# Patient Record
Sex: MALE | Race: WHITE | NOT HISPANIC OR LATINO | Employment: OTHER | URBAN - METROPOLITAN AREA
[De-identification: names, ages, dates, MRNs, and addresses within clinical notes are randomized per-mention and may not be internally consistent; named-entity substitution may affect disease eponyms.]

---

## 2017-01-18 ENCOUNTER — LAB REQUISITION (OUTPATIENT)
Dept: LAB | Facility: HOSPITAL | Age: 72
End: 2017-01-18
Payer: COMMERCIAL

## 2017-01-18 DIAGNOSIS — K63.5 POLYP OF COLON: ICD-10-CM

## 2017-01-18 PROCEDURE — 88305 TISSUE EXAM BY PATHOLOGIST: CPT | Performed by: COLON & RECTAL SURGERY

## 2018-04-02 NOTE — H&P
Grade 4 internal external hemorrhoids with the bleeding for internal external hemorrhoidectomy as explained  Otherwise history and physical is essentially normal as noted    Colonoscopy January 2018 was normal

## 2018-04-03 ENCOUNTER — TRANSCRIBE ORDERS (OUTPATIENT)
Dept: LAB | Facility: CLINIC | Age: 73
End: 2018-04-03

## 2018-04-03 ENCOUNTER — APPOINTMENT (OUTPATIENT)
Dept: LAB | Facility: CLINIC | Age: 73
End: 2018-04-03
Payer: COMMERCIAL

## 2018-04-03 DIAGNOSIS — K62.5 HEMORRHAGE OF RECTUM AND ANUS: ICD-10-CM

## 2018-04-03 DIAGNOSIS — K62.5 HEMORRHAGE OF RECTUM AND ANUS: Primary | ICD-10-CM

## 2018-04-03 LAB
ALBUMIN SERPL BCP-MCNC: 3.9 G/DL (ref 3.5–5)
ALP SERPL-CCNC: 74 U/L (ref 46–116)
ALT SERPL W P-5'-P-CCNC: 31 U/L (ref 12–78)
ANION GAP SERPL CALCULATED.3IONS-SCNC: 6 MMOL/L (ref 4–13)
AST SERPL W P-5'-P-CCNC: 15 U/L (ref 5–45)
BASOPHILS # BLD AUTO: 0.04 THOUSANDS/ΜL (ref 0–0.1)
BASOPHILS NFR BLD AUTO: 1 % (ref 0–1)
BILIRUB SERPL-MCNC: 0.3 MG/DL (ref 0.2–1)
BILIRUB UR QL STRIP: NEGATIVE
BUN SERPL-MCNC: 16 MG/DL (ref 5–25)
CALCIUM SERPL-MCNC: 8.8 MG/DL (ref 8.3–10.1)
CHLORIDE SERPL-SCNC: 105 MMOL/L (ref 100–108)
CLARITY UR: CLEAR
CO2 SERPL-SCNC: 31 MMOL/L (ref 21–32)
COLOR UR: YELLOW
CREAT SERPL-MCNC: 1.08 MG/DL (ref 0.6–1.3)
EOSINOPHIL # BLD AUTO: 0.21 THOUSAND/ΜL (ref 0–0.61)
EOSINOPHIL NFR BLD AUTO: 3 % (ref 0–6)
ERYTHROCYTE [DISTWIDTH] IN BLOOD BY AUTOMATED COUNT: 13 % (ref 11.6–15.1)
GFR SERPL CREATININE-BSD FRML MDRD: 67 ML/MIN/1.73SQ M
GLUCOSE P FAST SERPL-MCNC: 110 MG/DL (ref 65–99)
GLUCOSE UR STRIP-MCNC: NEGATIVE MG/DL
HCT VFR BLD AUTO: 44.3 % (ref 36.5–49.3)
HGB BLD-MCNC: 14.8 G/DL (ref 12–17)
HGB UR QL STRIP.AUTO: NEGATIVE
KETONES UR STRIP-MCNC: NEGATIVE MG/DL
LEUKOCYTE ESTERASE UR QL STRIP: NEGATIVE
LYMPHOCYTES # BLD AUTO: 2.08 THOUSANDS/ΜL (ref 0.6–4.47)
LYMPHOCYTES NFR BLD AUTO: 32 % (ref 14–44)
MCH RBC QN AUTO: 30.9 PG (ref 26.8–34.3)
MCHC RBC AUTO-ENTMCNC: 33.4 G/DL (ref 31.4–37.4)
MCV RBC AUTO: 93 FL (ref 82–98)
MONOCYTES # BLD AUTO: 0.61 THOUSAND/ΜL (ref 0.17–1.22)
MONOCYTES NFR BLD AUTO: 9 % (ref 4–12)
NEUTROPHILS # BLD AUTO: 3.58 THOUSANDS/ΜL (ref 1.85–7.62)
NEUTS SEG NFR BLD AUTO: 55 % (ref 43–75)
NITRITE UR QL STRIP: NEGATIVE
PH UR STRIP.AUTO: 6 [PH] (ref 4.5–8)
PLATELET # BLD AUTO: 258 THOUSANDS/UL (ref 149–390)
PMV BLD AUTO: 10.1 FL (ref 8.9–12.7)
POTASSIUM SERPL-SCNC: 3.9 MMOL/L (ref 3.5–5.3)
PROT SERPL-MCNC: 7.2 G/DL (ref 6.4–8.2)
PROT UR STRIP-MCNC: NEGATIVE MG/DL
RBC # BLD AUTO: 4.79 MILLION/UL (ref 3.88–5.62)
SODIUM SERPL-SCNC: 142 MMOL/L (ref 136–145)
SP GR UR STRIP.AUTO: 1.02 (ref 1–1.03)
UROBILINOGEN UR QL STRIP.AUTO: 0.2 E.U./DL
WBC # BLD AUTO: 6.52 THOUSAND/UL (ref 4.31–10.16)

## 2018-04-03 PROCEDURE — 80053 COMPREHEN METABOLIC PANEL: CPT

## 2018-04-03 PROCEDURE — 36415 COLL VENOUS BLD VENIPUNCTURE: CPT

## 2018-04-03 PROCEDURE — 81003 URINALYSIS AUTO W/O SCOPE: CPT | Performed by: COLON & RECTAL SURGERY

## 2018-04-03 PROCEDURE — 85025 COMPLETE CBC W/AUTO DIFF WBC: CPT

## 2018-04-13 RX ORDER — TAMSULOSIN HYDROCHLORIDE 0.4 MG/1
0.4 CAPSULE ORAL
COMMUNITY
End: 2018-06-13 | Stop reason: SDUPTHER

## 2018-04-13 RX ORDER — MULTIVITAMIN
1 TABLET ORAL DAILY
COMMUNITY

## 2018-04-13 NOTE — PRE-PROCEDURE INSTRUCTIONS
Pre-Surgery Instructions:   Medication Instructions    B Complex Vitamins (VITAMIN B-COMPLEX PO) Instructed patient per Anesthesia Guidelines   Cholecalciferol (VITAMIN D-3 PO) Instructed patient per Anesthesia Guidelines   Multiple Vitamin (MULTIVITAMIN) tablet Instructed patient per Anesthesia Guidelines   tamsulosin (FLOMAX) 0 4 mg Instructed patient per Anesthesia Guidelines  Spoke to pt  Medication list reviewed & instructed  As of 4 13 18 pt to stop vitamins  Instructed on tylenol only  Am DOS no meds  Showering instructions & fleet enema instructions given by office, reviewed at time of call  All instructions verbally understood  No further questions

## 2018-04-16 ENCOUNTER — ANESTHESIA EVENT (OUTPATIENT)
Dept: PERIOP | Facility: HOSPITAL | Age: 73
End: 2018-04-16
Payer: COMMERCIAL

## 2018-04-17 ENCOUNTER — ANESTHESIA (OUTPATIENT)
Dept: PERIOP | Facility: HOSPITAL | Age: 73
End: 2018-04-17
Payer: COMMERCIAL

## 2018-04-17 ENCOUNTER — HOSPITAL ENCOUNTER (OUTPATIENT)
Facility: HOSPITAL | Age: 73
Setting detail: OUTPATIENT SURGERY
Discharge: HOME | End: 2018-04-17
Attending: COLON & RECTAL SURGERY | Admitting: COLON & RECTAL SURGERY
Payer: COMMERCIAL

## 2018-04-17 VITALS
DIASTOLIC BLOOD PRESSURE: 76 MMHG | OXYGEN SATURATION: 100 % | HEART RATE: 69 BPM | BODY MASS INDEX: 26.37 KG/M2 | TEMPERATURE: 98.1 F | RESPIRATION RATE: 18 BRPM | HEIGHT: 67 IN | WEIGHT: 168 LBS | SYSTOLIC BLOOD PRESSURE: 128 MMHG

## 2018-04-17 DIAGNOSIS — K64.4 RESIDUAL HEMORRHOIDAL SKIN TAGS: ICD-10-CM

## 2018-04-17 DIAGNOSIS — K64.8 OTHER HEMORRHOIDS: ICD-10-CM

## 2018-04-17 PROCEDURE — 88304 TISSUE EXAM BY PATHOLOGIST: CPT | Performed by: PATHOLOGY

## 2018-04-17 RX ORDER — ONDANSETRON 2 MG/ML
4 INJECTION INTRAMUSCULAR; INTRAVENOUS ONCE AS NEEDED
Status: DISCONTINUED | OUTPATIENT
Start: 2018-04-17 | End: 2018-04-17 | Stop reason: HOSPADM

## 2018-04-17 RX ORDER — LIDOCAINE HYDROCHLORIDE 10 MG/ML
INJECTION, SOLUTION INFILTRATION; PERINEURAL AS NEEDED
Status: DISCONTINUED | OUTPATIENT
Start: 2018-04-17 | End: 2018-04-17 | Stop reason: SURG

## 2018-04-17 RX ORDER — SODIUM CHLORIDE, SODIUM LACTATE, POTASSIUM CHLORIDE, CALCIUM CHLORIDE 600; 310; 30; 20 MG/100ML; MG/100ML; MG/100ML; MG/100ML
20 INJECTION, SOLUTION INTRAVENOUS CONTINUOUS
Status: DISCONTINUED | OUTPATIENT
Start: 2018-04-17 | End: 2018-04-17

## 2018-04-17 RX ORDER — ALBUTEROL SULFATE 2.5 MG/3ML
2.5 SOLUTION RESPIRATORY (INHALATION) ONCE AS NEEDED
Status: DISCONTINUED | OUTPATIENT
Start: 2018-04-17 | End: 2018-04-17 | Stop reason: HOSPADM

## 2018-04-17 RX ORDER — PROMETHAZINE HYDROCHLORIDE 25 MG/ML
12.5 INJECTION, SOLUTION INTRAMUSCULAR; INTRAVENOUS ONCE AS NEEDED
Status: DISCONTINUED | OUTPATIENT
Start: 2018-04-17 | End: 2018-04-17 | Stop reason: HOSPADM

## 2018-04-17 RX ORDER — BUPIVACAINE HYDROCHLORIDE AND EPINEPHRINE 2.5; 5 MG/ML; UG/ML
INJECTION, SOLUTION EPIDURAL; INFILTRATION; INTRACAUDAL; PERINEURAL AS NEEDED
Status: DISCONTINUED | OUTPATIENT
Start: 2018-04-17 | End: 2018-04-17 | Stop reason: HOSPADM

## 2018-04-17 RX ORDER — DOCUSATE SODIUM 100 MG/1
100 CAPSULE, LIQUID FILLED ORAL 2 TIMES DAILY
Status: DISCONTINUED | OUTPATIENT
Start: 2018-04-17 | End: 2018-04-17 | Stop reason: HOSPADM

## 2018-04-17 RX ORDER — SODIUM CHLORIDE, SODIUM LACTATE, POTASSIUM CHLORIDE, CALCIUM CHLORIDE 600; 310; 30; 20 MG/100ML; MG/100ML; MG/100ML; MG/100ML
50 INJECTION, SOLUTION INTRAVENOUS CONTINUOUS
Status: DISCONTINUED | OUTPATIENT
Start: 2018-04-17 | End: 2018-04-17 | Stop reason: HOSPADM

## 2018-04-17 RX ORDER — FENTANYL CITRATE 50 UG/ML
INJECTION, SOLUTION INTRAMUSCULAR; INTRAVENOUS AS NEEDED
Status: DISCONTINUED | OUTPATIENT
Start: 2018-04-17 | End: 2018-04-17 | Stop reason: SURG

## 2018-04-17 RX ORDER — PROPOFOL 10 MG/ML
INJECTION, EMULSION INTRAVENOUS CONTINUOUS PRN
Status: DISCONTINUED | OUTPATIENT
Start: 2018-04-17 | End: 2018-04-17 | Stop reason: SURG

## 2018-04-17 RX ORDER — OXYCODONE HYDROCHLORIDE AND ACETAMINOPHEN 5; 325 MG/1; MG/1
1 TABLET ORAL EVERY 4 HOURS PRN
Status: DISCONTINUED | OUTPATIENT
Start: 2018-04-17 | End: 2018-04-17 | Stop reason: HOSPADM

## 2018-04-17 RX ORDER — FENTANYL CITRATE/PF 50 MCG/ML
50 SYRINGE (ML) INJECTION
Status: DISCONTINUED | OUTPATIENT
Start: 2018-04-17 | End: 2018-04-17 | Stop reason: HOSPADM

## 2018-04-17 RX ORDER — EPHEDRINE SULFATE 50 MG/ML
INJECTION, SOLUTION INTRAVENOUS AS NEEDED
Status: DISCONTINUED | OUTPATIENT
Start: 2018-04-17 | End: 2018-04-17 | Stop reason: SURG

## 2018-04-17 RX ORDER — SODIUM CHLORIDE, SODIUM LACTATE, POTASSIUM CHLORIDE, CALCIUM CHLORIDE 600; 310; 30; 20 MG/100ML; MG/100ML; MG/100ML; MG/100ML
75 INJECTION, SOLUTION INTRAVENOUS CONTINUOUS
Status: DISCONTINUED | OUTPATIENT
Start: 2018-04-17 | End: 2018-04-17 | Stop reason: HOSPADM

## 2018-04-17 RX ORDER — OXYCODONE HYDROCHLORIDE AND ACETAMINOPHEN 5; 325 MG/1; MG/1
1 TABLET ORAL EVERY 4 HOURS PRN
Qty: 20 TABLET | Refills: 0 | Status: SHIPPED | OUTPATIENT
Start: 2018-04-17 | End: 2018-04-27

## 2018-04-17 RX ORDER — METOCLOPRAMIDE HYDROCHLORIDE 5 MG/ML
10 INJECTION INTRAMUSCULAR; INTRAVENOUS ONCE AS NEEDED
Status: DISCONTINUED | OUTPATIENT
Start: 2018-04-17 | End: 2018-04-17 | Stop reason: HOSPADM

## 2018-04-17 RX ADMIN — FENTANYL CITRATE 25 MCG: 50 INJECTION, SOLUTION INTRAMUSCULAR; INTRAVENOUS at 10:51

## 2018-04-17 RX ADMIN — EPHEDRINE SULFATE 5 MG: 50 INJECTION, SOLUTION INTRAMUSCULAR; INTRAVENOUS; SUBCUTANEOUS at 11:14

## 2018-04-17 RX ADMIN — LIDOCAINE HYDROCHLORIDE 100 MG: 10 INJECTION, SOLUTION INFILTRATION; PERINEURAL at 10:48

## 2018-04-17 RX ADMIN — EPHEDRINE SULFATE 5 MG: 50 INJECTION, SOLUTION INTRAMUSCULAR; INTRAVENOUS; SUBCUTANEOUS at 11:20

## 2018-04-17 RX ADMIN — SODIUM CHLORIDE, SODIUM LACTATE, POTASSIUM CHLORIDE, AND CALCIUM CHLORIDE 20 ML/HR: .6; .31; .03; .02 INJECTION, SOLUTION INTRAVENOUS at 08:57

## 2018-04-17 RX ADMIN — FENTANYL CITRATE 25 MCG: 50 INJECTION, SOLUTION INTRAMUSCULAR; INTRAVENOUS at 10:48

## 2018-04-17 RX ADMIN — OXYCODONE HYDROCHLORIDE AND ACETAMINOPHEN 1 TABLET: 5; 325 TABLET ORAL at 13:12

## 2018-04-17 RX ADMIN — PROPOFOL 60 MCG/KG/MIN: 10 INJECTION, EMULSION INTRAVENOUS at 10:48

## 2018-04-17 RX ADMIN — EPHEDRINE SULFATE 5 MG: 50 INJECTION, SOLUTION INTRAMUSCULAR; INTRAVENOUS; SUBCUTANEOUS at 10:53

## 2018-04-17 RX ADMIN — EPHEDRINE SULFATE 10 MG: 50 INJECTION, SOLUTION INTRAMUSCULAR; INTRAVENOUS; SUBCUTANEOUS at 11:01

## 2018-04-17 NOTE — PROGRESS NOTES
Tolerated light snack PO  Pt's prescription tubed to [facility] tube # 11  Pt's wife Melissa Yanes to  when ready

## 2018-04-17 NOTE — ANESTHESIA POSTPROCEDURE EVALUATION
Post-Op Assessment Note      CV Status:  Stable    Mental Status:  Alert and awake    Hydration Status:  Euvolemic    PONV Controlled:  Controlled    Airway Patency:  Patent    Post Op Vitals Reviewed: Yes          Staff: CRNA           BP   111/69   Temp   98 4   Pulse  72   Resp   18   SpO2   98

## 2018-04-17 NOTE — ANESTHESIA PREPROCEDURE EVALUATION
Review of Systems/Medical History  Patient summary reviewed  Chart reviewed  No history of anesthetic complications     Cardiovascular  EKG reviewed,   Comment: Sinus bradycardia  Otherwise normal ECG  No previous ECGs available  Confirmed by JARED Mayer , Gee Moreno ([id number]) on 12/31/2015 8:58:14 AM    Specimen Collected: 12/30/15 11:34  Last Resulted: 12/31/15 08:58      ,  Pulmonary       GI/Hepatic      Comment: hemorrhoids          Endo/Other     GYN       Hematology   Musculoskeletal       Neurology   Psychology           Physical Exam    Airway    Mallampati score: II  TM Distance: >3 FB  Neck ROM: full     Dental   No notable dental hx     Cardiovascular  Cardiovascular exam normal    Pulmonary  Pulmonary exam normal Breath sounds clear to auscultation,     Other Findings        Anesthesia Plan  ASA Score- 2     Anesthesia Type- IV sedation with anesthesia with ASA Monitors  Additional Monitors:   Airway Plan:         Plan Factors- Patient instructed to abstain from smoking on day of procedure  Patient did not smoke on day of surgery  Induction- intravenous  Postoperative Plan- Plan for postoperative opioid use  Informed Consent- Anesthetic plan and risks discussed with patient  I personally reviewed this patient with the CRNA  Discussed and agreed on the Anesthesia Plan with the CRNA           Lab Results   Component Value Date    WBC 6 52 04/03/2018    HGB 14 8 04/03/2018    HCT 44 3 04/03/2018    MCV 93 04/03/2018     04/03/2018     Lab Results   Component Value Date    GLUCOSE 105 12/30/2015    CALCIUM 8 8 04/03/2018     04/03/2018    K 3 9 04/03/2018    CO2 31 04/03/2018     04/03/2018    BUN 16 04/03/2018    CREATININE 1 08 04/03/2018     No results found for: INR, PROTIME  No results found for: PTT        I, Dr Katelin Roth, the attending physician, have personally seen and evaluated the patient prior to anesthetic care   I have reviewed the pre-anesthetic record, and other medical records if appropriate to the anesthetic care  If a CRNA is involved in the case, I have reviewed the CRNA assessment, if present, and agree  The patient is in a suitable condition to proceed with my formulated anesthetic plan

## 2018-04-17 NOTE — OP NOTE
OPERATIVE REPORT  PATIENT NAME: Leanne Ocampo    :  1945  MRN: [id number]  Pt Location: [facility] OR ROOM 10    SURGERY DATE: 2018    Surgeon(s) and Role:     * Belkis Garcia MD - Primary     * Erlin Boucher MD - Assisting    Preop Diagnosis:  Residual hemorrhoidal skin tags [K64 4]  Other hemorrhoids [K64 8]    Post-Op Diagnosis Codes:     * Residual hemorrhoidal skin tags [K64 4]     * Other hemorrhoids [K64 8]    Procedure(s) (LRB):  EXTENSIVE internal and external HEMORRHOIDECTOMY EXCISION (N/A)    Specimen(s):  ID Type Source Tests Collected by Time Destination   1 : extensive internal and external hemmorrhoid Tissue Hemorrhoids TISSUE EXAM Belkis Garcia MD 2018 1054        Estimated Blood Loss:   50 mL    Drains:       Anesthesia Type:   Choice    Operative Indications:  Residual hemorrhoidal skin tags [K64 4]  Other hemorrhoids [K64 8]      Operative Findings:  Right anterolateral and right posterolateral and left lateral large internal external hemorrhoid class 4 were excised  Left posterolateral external hemorrhoidal skin tag was excised  All wounds were closed using 5 0 Vicryl running suture  Hemostasis was well affected    Complications:   None    Procedure and Technique:  After proper facial and armband identification and time out, patient under mild IV sedation was placed in prone position and 20  Cc  25% Bupivacaine local anethesia was injected in the perianal area  With satisfactory local anesthesia obtained the anal area was inspected  There was circumferential internal and external hemorrhoidal components present that could be localized in right anterolateral, left lateral and right posterolateral perianal aspect  The hemorrhoidal components were all excised and submucosal dissection for extensive circumferential hemorrhoidectomy was performed  All wounds were closed using 5-0 Vicryl running suture   Left posterolateral external hemorrhoidal skin tag was also excised and the wound was closed using 5 0 Vicryl running suture  Hemostasis was further obtained using interrupted sutures  Sponges and counts were all correct  The patient was taken to the recovery room in satisfactory condition       I was present for the entire procedure    Patient Disposition:  PACU     SIGNATURE: Roxie Bar MD  DATE: April 17, 2018  TIME: 11:45 AM

## 2018-04-17 NOTE — DISCHARGE INSTRUCTIONS
Hemorrhoidectomy   WHAT YOU NEED TO KNOW:   A hemorrhoidectomy is surgery to remove a hemorrhoid  DISCHARGE INSTRUCTIONS:   Call 911 for any of the following:   · You have trouble breathing  Seek care immediately if:   · Blood soaks through your bandage or underwear  · Your stitches come apart  · You have severe pain in your rectum or abdomen  · You cannot urinate, or you urinate very little  Contact your healthcare provider if:   · You have a fever or chills  · Your pain does not get better after you take pain medicine  · You do not have a bowel movement within 48 hours after surgery  · You have severe pain when you have a bowel movement  · Your wound is red, swollen, or draining pus  · You have nausea or are vomiting  · Your skin is itchy, swollen, or you have a rash  · You have trouble controlling your bowel movements  · You have questions or concerns about your condition or care  Medicines: You may need any of the following:  · Medicine  may be given to decrease pain and swelling  The medicine may come as a pad, cream, or ointment  · Antibiotics  help prevent a bacterial infection  They may be given as a pill or an ointment  · Stool softeners  help prevent constipation  · Laxatives  help you have a bowel movement and prevent constipation  · Prescription pain medicine  may be given  Ask your healthcare provider how to take this medicine safely  Some prescription pain medicines contain acetaminophen  Do not take other medicines that contain acetaminophen without talking to your healthcare provider  Too much acetaminophen may cause liver damage  Prescription pain medicine may cause constipation  Ask your healthcare provider how to prevent or treat constipation  · NSAIDs , such as ibuprofen, help decrease swelling, pain, and fever  NSAIDs can cause stomach bleeding or kidney problems in certain people   If you take blood thinner medicine, always ask your healthcare provider if NSAIDs are safe for you  Always read the medicine label and follow directions  · Take your medicine as directed  Contact your healthcare provider if you think your medicine is not helping or if you have side effects  Tell him or her if you are allergic to any medicine  Keep a list of the medicines, vitamins, and herbs you take  Include the amounts, and when and why you take them  Bring the list or the pill bottles to follow-up visits  Carry your medicine list with you in case of an emergency  Care for your wound as directed:   · Remove your bandage or packing as directed  Carefully wash around the wound with soap and water  It is okay to let soap and water gently run over your incision  Gently pat the area dry  Apply ointment or cream as directed  Put on new, clean bandages as directed  Change your bandages when they get wet or dirty  · Keep your anal area clean  After a bowel movement, wipe with moist towelettes or wet toilet paper  Dry toilet paper can irritate the area  Wear a sanitary pad to absorb bleeding and keep the area clean and dry  Self-care:   · Apply ice on your anus for 15 to 20 minutes every hour or as directed  Use an ice pack, or put crushed ice in a plastic bag  Cover it with a towel before you put it on your skin  Ice helps prevent tissue damage and decreases swelling and pain  · Take a sitz bath  A sitz bath can help decrease pain and swelling  Do this 3 times a day, and after each bowel movement  Fill a bathtub with 4 to 6 inches of warm water  You may also use a sitz bath pan that fits inside a toilet bowl  Sit in the sitz bath for 15 minutes  · Sit on a pillow or a donut-shaped cushion  This helps relieve pressure and pain on your incision  Ask your healthcare provider where to buy a donut-shaped cushion  If you have pain when you sit, lie on your side  · Do not have anal sex  Anal sex can cause your stitches to come apart   Ask your healthcare provider how long you need to follow these instructions  · Do not lift anything heavier than 5 pounds  This can increase pressure in your rectum or anus and cause your incision to come apart  Prevent constipation:  You should try to have a bowel movement within 48 hours after surgery  Constipation can cause pain and put pressure on your incision  Do the following to prevent constipation:  · Drink plenty of liquids  Liquids can help prevent constipation and straining  Ask how much liquid to drink each day and which liquids are best for you  · Eat a variety of high-fiber foods  This will help make it easier to have a bowel movement  Examples include fruits, vegetables, and whole grains  Ask your healthcare provider how much fiber you need each day  You may need to take a fiber supplement  · Exercise as directed  Exercise, such as walking, may make it easier to have a bowel movement  Ask your healthcare provider what exercises are safe for you to do after surgery  Follow up with your healthcare provider as directed:  Write down your questions so you remember to ask them during your visits  © 2017 [facility] Information is for End User's use only and may not be sold, redistributed or otherwise used for commercial purposes  All illustrations and images included in CareNotes® are the copyrighted property of A D A M , Inc  or Johnathon David  The above information is an  only  It is not intended as medical advice for individual conditions or treatments  Talk to your doctor, nurse or pharmacist before following any medical regimen to see if it is safe and effective for you

## 2018-04-17 NOTE — PERIOPERATIVE NURSING NOTE
Report received from Vasiliy Khanna, [facility]  Assessment wnl  Will continue to monitor

## 2018-06-13 ENCOUNTER — OFFICE VISIT (OUTPATIENT)
Dept: UROLOGY | Facility: CLINIC | Age: 73
End: 2018-06-13
Payer: COMMERCIAL

## 2018-06-13 VITALS
HEART RATE: 65 BPM | SYSTOLIC BLOOD PRESSURE: 121 MMHG | DIASTOLIC BLOOD PRESSURE: 76 MMHG | WEIGHT: 168 LBS | BODY MASS INDEX: 26.37 KG/M2 | HEIGHT: 67 IN

## 2018-06-13 DIAGNOSIS — N40.1 BPH WITH OBSTRUCTION/LOWER URINARY TRACT SYMPTOMS: ICD-10-CM

## 2018-06-13 DIAGNOSIS — N42.9 PROSTATE DISORDER: Primary | ICD-10-CM

## 2018-06-13 DIAGNOSIS — N13.8 BPH WITH OBSTRUCTION/LOWER URINARY TRACT SYMPTOMS: ICD-10-CM

## 2018-06-13 LAB
SL AMB  POCT GLUCOSE, UA: NORMAL
SL AMB LEUKOCYTE ESTERASE,UA: NORMAL
SL AMB POCT BLOOD,UA: NORMAL
SL AMB POCT CLARITY,UA: CLEAR
SL AMB POCT COLOR,UA: YELLOW
SL AMB POCT KETONES,UA: NORMAL
SL AMB POCT NITRITE,UA: NORMAL
SL AMB POCT PH,UA: 5
SL AMB POCT URINE PROTEIN: NORMAL

## 2018-06-13 PROCEDURE — 81002 URINALYSIS NONAUTO W/O SCOPE: CPT | Performed by: UROLOGY

## 2018-06-13 PROCEDURE — 99213 OFFICE O/P EST LOW 20 MIN: CPT | Performed by: UROLOGY

## 2018-06-13 RX ORDER — TAMSULOSIN HYDROCHLORIDE 0.4 MG/1
0.4 CAPSULE ORAL
Qty: 90 CAPSULE | Refills: 3 | Status: SHIPPED | OUTPATIENT
Start: 2018-06-13 | End: 2019-06-19 | Stop reason: SDUPTHER

## 2018-06-13 NOTE — PROGRESS NOTES
UROLOGY PROGRESS NOTE   Patient Identifiers: Nasir Mueller (MRN [de-identified])  Date of Service: 6/13/2018    Subjective:    74year old male with a long history of BPh and elevated PSa  He had 2 prostate biopsied in 2003 both benign  PSA's have been in the 5 range  Current PSA is pending  Last year he had a 4 k score showing 92% negative predictive value for aggressive disease  AUA index score 14  Comfortable on tamsulosin  Patient has  no complaints  Objective:     VITALS:    Vitals:    06/13/18 0953   BP: 121/76   Pulse: 65     AUA SYMPTOM SCORE      Most Recent Value   AUA SYMPTOM SCORE   How often have you had a sensation of not emptying your bladder completely after you finished urinating? 3   How often have you had to urinate again less than two hours after you finished urinating? 2   How often have you found you stopped and started again several times when you urinate? 2   How often have you found it difficult to postpone urination? 1   How often have you had a weak urinary stream?  3   How often have you had to push or strain to begin urination? 1   How many times did you most typically get up to urinate from the time you went to bed at night until the time you got up in the morning?   2   Quality of Life: If you were to spend the rest of your life with your urinary condition just the way it is now, how would you feel about that?  --   AUA SYMPTOM SCORE  14            LABS:  Lab Results   Component Value Date    HGB 14 8 04/03/2018    HCT 44 3 04/03/2018    WBC 6 52 04/03/2018     04/03/2018   ]    Lab Results   Component Value Date     04/03/2018    K 3 9 04/03/2018     04/03/2018    CO2 31 04/03/2018    BUN 16 04/03/2018    CREATININE 1 08 04/03/2018    CALCIUM 8 8 04/03/2018    GLUCOSE 105 12/30/2015   ]    INPATIENT MEDS:    Current Outpatient Prescriptions:     B Complex Vitamins (VITAMIN B-COMPLEX PO), Take by mouth, Disp: , Rfl:     Cholecalciferol (VITAMIN D-3 PO), Take by mouth, Disp: , Rfl:     Multiple Vitamin (MULTIVITAMIN) tablet, Take 1 tablet by mouth daily, Disp: , Rfl:     tamsulosin (FLOMAX) 0 4 mg, Take 1 capsule (0 4 mg total) by mouth daily with dinner, Disp: 90 capsule, Rfl: 3      Physical Exam:   /76 (BP Location: Right arm, Patient Position: Sitting, Cuff Size: Adult)   Pulse 65   Ht 5' 7" (1 702 m)   Wt 76 2 kg (168 lb)   BMI 26 31 kg/m²   GEN: no acute distress    RESP: breathing comfortably with no accessory muscle use    ABD: soft, non-tender, non-distended   INCISION:    EXT: no significant peripheral edema   (Male): Penis circumcised, phallus normal, meatus patent  Testicles descended into scrotum bilaterally without masses nor tenderness  No inguinal hernias bilaterally  ROBERTO: Prostate is enlarged at 45 grams  The prostate is not boggy  The prostate is not tender  No nodules noted    RADIOLOGY:   none     Assessment:   1  BPH  2   Elevated PSA     Plan:   - follow up one year PSA prior  -  Continue tamsulosin   -  -

## 2019-06-19 ENCOUNTER — OFFICE VISIT (OUTPATIENT)
Dept: UROLOGY | Facility: CLINIC | Age: 74
End: 2019-06-19
Payer: COMMERCIAL

## 2019-06-19 VITALS
WEIGHT: 171 LBS | BODY MASS INDEX: 26.78 KG/M2 | HEART RATE: 69 BPM | SYSTOLIC BLOOD PRESSURE: 118 MMHG | DIASTOLIC BLOOD PRESSURE: 68 MMHG

## 2019-06-19 DIAGNOSIS — N40.1 BPH WITH OBSTRUCTION/LOWER URINARY TRACT SYMPTOMS: ICD-10-CM

## 2019-06-19 DIAGNOSIS — R97.20 ELEVATED PSA: Primary | ICD-10-CM

## 2019-06-19 DIAGNOSIS — N13.8 BPH WITH OBSTRUCTION/LOWER URINARY TRACT SYMPTOMS: ICD-10-CM

## 2019-06-19 PROCEDURE — 99213 OFFICE O/P EST LOW 20 MIN: CPT | Performed by: PHYSICIAN ASSISTANT

## 2019-06-19 RX ORDER — TAMSULOSIN HYDROCHLORIDE 0.4 MG/1
0.4 CAPSULE ORAL
Qty: 90 CAPSULE | Refills: 3 | Status: SHIPPED | OUTPATIENT
Start: 2019-06-19 | End: 2020-06-11 | Stop reason: SDUPTHER

## 2020-06-11 DIAGNOSIS — N13.8 BPH WITH OBSTRUCTION/LOWER URINARY TRACT SYMPTOMS: ICD-10-CM

## 2020-06-11 DIAGNOSIS — N40.1 BPH WITH OBSTRUCTION/LOWER URINARY TRACT SYMPTOMS: ICD-10-CM

## 2020-06-11 RX ORDER — TAMSULOSIN HYDROCHLORIDE 0.4 MG/1
0.4 CAPSULE ORAL
Qty: 90 CAPSULE | Refills: 0 | Status: SHIPPED | OUTPATIENT
Start: 2020-06-11 | End: 2020-06-25 | Stop reason: SDUPTHER

## 2020-06-25 ENCOUNTER — OFFICE VISIT (OUTPATIENT)
Dept: UROLOGY | Facility: CLINIC | Age: 75
End: 2020-06-25
Payer: COMMERCIAL

## 2020-06-25 VITALS
BODY MASS INDEX: 27.09 KG/M2 | TEMPERATURE: 97.4 F | SYSTOLIC BLOOD PRESSURE: 118 MMHG | WEIGHT: 172.6 LBS | HEIGHT: 67 IN | DIASTOLIC BLOOD PRESSURE: 76 MMHG | HEART RATE: 74 BPM

## 2020-06-25 DIAGNOSIS — N13.8 BPH WITH OBSTRUCTION/LOWER URINARY TRACT SYMPTOMS: ICD-10-CM

## 2020-06-25 DIAGNOSIS — N40.1 BPH WITH OBSTRUCTION/LOWER URINARY TRACT SYMPTOMS: ICD-10-CM

## 2020-06-25 DIAGNOSIS — R97.20 ELEVATED PSA: Primary | ICD-10-CM

## 2020-06-25 LAB — POST-VOID RESIDUAL VOLUME, ML POC: 42 ML

## 2020-06-25 PROCEDURE — 51798 US URINE CAPACITY MEASURE: CPT | Performed by: PHYSICIAN ASSISTANT

## 2020-06-25 PROCEDURE — 99213 OFFICE O/P EST LOW 20 MIN: CPT | Performed by: PHYSICIAN ASSISTANT

## 2020-06-25 RX ORDER — TAMSULOSIN HYDROCHLORIDE 0.4 MG/1
0.4 CAPSULE ORAL
Qty: 90 CAPSULE | Refills: 3 | Status: SHIPPED | OUTPATIENT
Start: 2020-06-25 | End: 2021-08-19

## 2021-04-01 ENCOUNTER — TELEPHONE (OUTPATIENT)
Dept: DERMATOLOGY | Facility: CLINIC | Age: 76
End: 2021-04-01

## 2021-04-01 NOTE — TELEPHONE ENCOUNTER
Telephone call to patient in regards to his MOHS referral from [facility] on his vertex  Left message for patient to give me a call back at his earliest convenience

## 2021-04-07 ENCOUNTER — CONSULT (OUTPATIENT)
Dept: DERMATOLOGY | Facility: CLINIC | Age: 76
End: 2021-04-07
Payer: COMMERCIAL

## 2021-04-07 VITALS — BODY MASS INDEX: 27.18 KG/M2 | HEIGHT: 67 IN | TEMPERATURE: 97.4 F | WEIGHT: 173.2 LBS

## 2021-04-07 DIAGNOSIS — C44.41 BCC (BASAL CELL CARCINOMA), SCALP/NECK: Primary | ICD-10-CM

## 2021-04-07 PROCEDURE — 99204 OFFICE O/P NEW MOD 45 MIN: CPT | Performed by: DERMATOLOGY

## 2021-04-07 NOTE — LETTER
April 8, 2021     Ruslan Randall MD  [address]    Patient: Hailee Ramos   YOB: 1945   Date of Visit: 4/7/2021       Dear Dr Naomi Jimenez: Thank you for referring Vasiliy Crawford to me for evaluation  Below are my notes for this consultation  If you have questions, please do not hesitate to call me  I look forward to following your patient along with you  Sincerely,        Aspen Vann MD        CC: No Recipients  Aspen Vann MD  4/7/2021  5:25 PM  Signed  MOHS CONSULTATION NOTE    Chief Complaint: referral for biopsy proven skin cancer  Date of visit: 04/07/21    Subjective:    Hailee Ramos is a 66 y o  old male who presents in consultation for a biopsy-proven basal cell carcinoma of the vertex of scalp  Referring physician, biopsy site, accession number of biopsy and diagnosis as follows:    Referring Physician:Dr Ruslan Randall  Biopsy Location: Vertex  Resulting Laboratory:   Case number: [id number]   Biopsy diagnosis: Basal cell carcinoma    Additional Hx: BCC right postauricular and left upper back    Objective:     CONSTITUTIONAL:   Vitals:    04/07/21 1436   Temp: (!) 97 4 °F (36 3 °C)       PSYCH: Normal mood and affect  EYES: Normal conjunctiva  ENT: Normal lips and oral mucosa  CARDIOVASCULAR: No edema  RESPIRATORY: Normal respirations  HEME/LYMPH/IMMUNO:  No regional lymphadenopathy except as noted below in "ASSESSMENT AND PLAN BY DIAGNOSIS"     SKIN:  FOCUSED ORGAN SYSTEM EXAM  Scalp Normal except as noted below in Assessment     ASSESSMENT AND PLAN, BY DIAGNOSIS:    1  BIOPSY PROVEN BASAL CELL CARCINOMA ON THE VERTEX    Physical Exam:   Anatomic Location Affected & Morphological Description:  Vertex; 0 8 cm crusted erosion   Pertinent Positives:   Pertinent Negatives:       Reviewed directly with patient treatment options, specifically Mohs   Discussed procedure at length including possible surgical complications and alternatives  All questions were answered in full   Repair will require coordination with Plastic Surgery, ENT or Oculoplastic facial Plastic Surgery? No  o If coordination of Care with other provider (Hortencia Squires, ENT) required, our staff will reach out to you once this has been coordinated   Date Mohs was scheduled and provider scheduled with: 05/26/2021 at 10:00 with Dr Fadumo Velásquez questions reviewed as follows: Do you have a pacemaker or defibrillator? no    Do you take antibiotics before skin or dental procedures? no  If yes, will likely require pre-operative antibiotics  Ask  the patient why they take the antibiotics (usually because of joint replacement)  Do you have a history of a joint replacements within the past 2 years? no   If yes, will likely require pre-operative antibiotics  Ask if orthopaedic surgeon has prescribed pre-operative antibiotics to take before procedures/dental work? Do you take any OTC medications that thin your blood (Aspirin, Aleve, Ibuprofen) or supplements that thin your blood (fish oil, garlic, vitamin E, Ginko Biloba)? no    Do you take any prescribed medications that thin your blood (Coumadin, Plavix, Xarelto, Eliquis or another prescribed blood thinner)? no    Do you have an allergy to lidocaine or epinephrine? no    Do you have an allergy to iodine? no    Do you smoke? Yes, some day smoker      If yes,  patient to try and stop 2 days before surgery and 7 days after the surgery  Minimizing smoking as much as possible during this time will improve healing and the cosmetic result after surgery   Are there remaining tumors to be scheduled?  no             Scribe Attestation    I,:  Reina Robles MA am acting as a scribe while in the presence of the attending physician :       I,:  Prince Messi MD personally performed the services described in this documentation    as scribed in my presence :

## 2021-04-07 NOTE — LETTER
April 7, 2021     Lionel Connor, [address] [place] [address]    Patient: Anh Pineda   YOB: 1945   Date of Visit: 4/7/2021       Dear Dr Kevin Centeno: Thank you for referring Liliam Lewis to me for evaluation  Below are my notes for this consultation  If you have questions, please do not hesitate to call me  I look forward to following your patient along with you           Sincerely,        Max Leblanc MD        CC: Tyler Davidson MD

## 2021-04-07 NOTE — PROGRESS NOTES
MOHS CONSULTATION NOTE    Chief Complaint: referral for biopsy proven skin cancer  Date of visit: 04/07/21    Subjective:    Kanika Deshpande is a 66 y o  old male who presents in consultation for a biopsy-proven basal cell carcinoma of the vertex of scalp  Referring physician, biopsy site, accession number of biopsy and diagnosis as follows:    Referring Physician:Dr Alana Lobo  Biopsy Location: Vertex  Resulting Laboratory:   Case number: [id number]   Biopsy diagnosis: Basal cell carcinoma    Additional Hx: BCC right postauricular and left upper back    Objective:     CONSTITUTIONAL:   Vitals:    04/07/21 1436   Temp: (!) 97 4 °F (36 3 °C)       PSYCH: Normal mood and affect  EYES: Normal conjunctiva  ENT: Normal lips and oral mucosa  CARDIOVASCULAR: No edema  RESPIRATORY: Normal respirations  HEME/LYMPH/IMMUNO:  No regional lymphadenopathy except as noted below in "ASSESSMENT AND PLAN BY DIAGNOSIS"     SKIN:  FOCUSED ORGAN SYSTEM EXAM  Scalp Normal except as noted below in Assessment     ASSESSMENT AND PLAN, BY DIAGNOSIS:    1  BIOPSY PROVEN BASAL CELL CARCINOMA ON THE VERTEX    Physical Exam:   Anatomic Location Affected & Morphological Description:  Vertex; 0 8 cm crusted erosion   Pertinent Positives:   Pertinent Negatives:       Reviewed directly with patient treatment options, specifically Mohs  Discussed procedure at length including possible surgical complications and alternatives  All questions were answered in full   Repair will require coordination with Plastic Surgery, ENT or Oculoplastic facial Plastic Surgery? No  o If coordination of Care with other provider (AJAY Lu) required, our staff will reach out to you once this has been coordinated   Date Mohs was scheduled and provider scheduled with: 05/26/2021 at 10:00 with Dr Jeniffer Oates questions reviewed as follows: Do you have a pacemaker or defibrillator?  no    Do you take antibiotics before skin or dental procedures? no  If yes, will likely require pre-operative antibiotics  Ask  the patient why they take the antibiotics (usually because of joint replacement)  Do you have a history of a joint replacements within the past 2 years? no   If yes, will likely require pre-operative antibiotics  Ask if orthopaedic surgeon has prescribed pre-operative antibiotics to take before procedures/dental work? Do you take any OTC medications that thin your blood (Aspirin, Aleve, Ibuprofen) or supplements that thin your blood (fish oil, garlic, vitamin E, Ginko Biloba)? no    Do you take any prescribed medications that thin your blood (Coumadin, Plavix, Xarelto, Eliquis or another prescribed blood thinner)? no    Do you have an allergy to lidocaine or epinephrine? no    Do you have an allergy to iodine? no    Do you smoke? Yes, some day smoker      If yes,  patient to try and stop 2 days before surgery and 7 days after the surgery  Minimizing smoking as much as possible during this time will improve healing and the cosmetic result after surgery   Are there remaining tumors to be scheduled?  no             Scribe Attestation    I,:  Jasmin Guillen MA am acting as a scribe while in the presence of the attending physician :       I,:  Bethanie Irby MD personally performed the services described in this documentation    as scribed in my presence :

## 2021-05-14 ENCOUNTER — OFFICE VISIT (OUTPATIENT)
Dept: UROLOGY | Facility: CLINIC | Age: 76
End: 2021-05-14
Payer: COMMERCIAL

## 2021-05-14 VITALS
HEIGHT: 67 IN | DIASTOLIC BLOOD PRESSURE: 78 MMHG | WEIGHT: 172 LBS | BODY MASS INDEX: 27 KG/M2 | SYSTOLIC BLOOD PRESSURE: 124 MMHG

## 2021-05-14 DIAGNOSIS — N40.1 BPH WITH OBSTRUCTION/LOWER URINARY TRACT SYMPTOMS: Primary | ICD-10-CM

## 2021-05-14 DIAGNOSIS — N13.8 BPH WITH OBSTRUCTION/LOWER URINARY TRACT SYMPTOMS: Primary | ICD-10-CM

## 2021-05-14 LAB
SL AMB  POCT GLUCOSE, UA: NORMAL
SL AMB LEUKOCYTE ESTERASE,UA: NORMAL
SL AMB POCT BILIRUBIN,UA: NORMAL
SL AMB POCT BLOOD,UA: NORMAL
SL AMB POCT CLARITY,UA: CLEAR
SL AMB POCT COLOR,UA: YELLOW
SL AMB POCT KETONES,UA: NORMAL
SL AMB POCT NITRITE,UA: NORMAL
SL AMB POCT PH,UA: 5
SL AMB POCT SPECIFIC GRAVITY,UA: 1
SL AMB POCT URINE PROTEIN: NORMAL
SL AMB POCT UROBILINOGEN: 0.2

## 2021-05-14 PROCEDURE — 99213 OFFICE O/P EST LOW 20 MIN: CPT | Performed by: PHYSICIAN ASSISTANT

## 2021-05-14 PROCEDURE — 81002 URINALYSIS NONAUTO W/O SCOPE: CPT | Performed by: PHYSICIAN ASSISTANT

## 2021-05-14 RX ORDER — FINASTERIDE 5 MG/1
5 TABLET, FILM COATED ORAL DAILY
Qty: 90 TABLET | Refills: 1 | Status: SHIPPED | OUTPATIENT
Start: 2021-05-14

## 2021-05-14 NOTE — PROGRESS NOTES
1  BPH with obstruction/lower urinary tract symptoms  finasteride (PROSCAR) 5 mg tablet    POCT urine dip       Assessment and plan:       1  Elevated PSA  - history of previous negative biopsy  - PSA stable with normal ROBERTO    2  BPH with lower urinary tract symptoms  - progressing LUTS despite tamsulosin  - emptying bladder well at last visit  - discussed 5  Alpha reductase inhibitor versus cystoscopy and transrectal ultrasound  Patient is not interested in surgical intervention at this time and wishes to start the finasteride in conjunction with his tamsulosin  Prescription sent to his pharmacy  Use side effect profile reviewed  Follow-up 6 months for reassessment  Dali Cardona PA-C      Chief Complaint     Prostate cancer screening    History of Present Illness     Vaughn Szymasnki is a 66 y o  male patient with a history of BPH and elevated PSA status post 2 negative prostate biopsies presenting for follow-up  Patient has a history of elevated PSA  Has undergone 2 biopsies of the prostate which were negative, last in 2003  He had a 4K score showing 92% negative predictive value for aggressive disease  Patient's most recent PSA is 6 75 (5/6/21), 4 6, 6 3 (03/19/2020), 4 9 (2019)  On tamsulosin monotherapy  Reporting weakened stream and occasional urinary incontinence without sensory awareness - typically a few drops of urine and not full urinary stream  Denies any dysuria, gross hematuria, suprapubic pressure, flank pain, fevers, ro chills  Laboratory     Lab Results   Component Value Date    CREATININE 1 08 04/03/2018         Review of Systems     Review of Systems   Constitutional: Negative for activity change, appetite change, chills, diaphoresis, fatigue, fever and unexpected weight change  Respiratory: Negative for chest tightness and shortness of breath  Cardiovascular: Negative for chest pain, palpitations and leg swelling     Gastrointestinal: Negative for abdominal distention, abdominal pain, constipation, diarrhea, nausea and vomiting  Genitourinary: Negative for decreased urine volume, difficulty urinating, dysuria, enuresis, flank pain, frequency, genital sores, hematuria and urgency  Musculoskeletal: Negative for back pain, gait problem and myalgias  Skin: Negative for color change, pallor, rash and wound  Psychiatric/Behavioral: Negative for behavioral problems  The patient is not nervous/anxious  Allergies     No Known Allergies    Physical Exam     Physical Exam  Constitutional:       General: He is not in acute distress  Appearance: He is well-developed  He is not diaphoretic  HENT:      Head: Normocephalic  Right Ear: External ear normal       Left Ear: External ear normal       Nose: Nose normal    Eyes:      General:         Right eye: No discharge  Left eye: No discharge  Conjunctiva/sclera: Conjunctivae normal    Neck:      Musculoskeletal: Normal range of motion  Trachea: No tracheal deviation  Pulmonary:      Effort: Pulmonary effort is normal  No respiratory distress  Genitourinary:     Comments: Good rectal tone  Prostate 45g, smooth, symmetric, no palpable nodules  Musculoskeletal:         General: No deformity  Skin:     Findings: No erythema or rash  Neurological:      Mental Status: He is alert and oriented to person, place, and time     Psychiatric:         Behavior: Behavior normal            Vital Signs     Vitals:    05/14/21 1047   BP: 124/78   BP Location: Left arm   Patient Position: Sitting   Cuff Size: Adult   Weight: 78 kg (172 lb)   Height: 5' 7" (1 702 m)         Current Medications       Current Outpatient Medications:     B Complex Vitamins (VITAMIN B-COMPLEX PO), Take by mouth, Disp: , Rfl:     Cholecalciferol (VITAMIN D-3 PO), Take by mouth, Disp: , Rfl:     GLUCOSAMINE-CHONDROITIN-CA-D3 PO, Take by mouth, Disp: , Rfl:     Multiple Vitamin (MULTIVITAMIN) tablet, Take 1 tablet by mouth daily, Disp: , Rfl:     tamsulosin (FLOMAX) 0 4 mg, Take 1 capsule (0 4 mg total) by mouth daily with dinner, Disp: 90 capsule, Rfl: 3    finasteride (PROSCAR) 5 mg tablet, Take 1 tablet (5 mg total) by mouth daily, Disp: 90 tablet, Rfl: 1      Active Problems     There is no problem list on file for this patient          Past Medical History     Past Medical History:   Diagnosis Date    Basal cell carcinoma 03/24/2021    vertex    BCC (basal cell carcinoma of skin) 03/24/2021    left upper back    BPH (benign prostatic hyperplasia)          Surgical History     Past Surgical History:   Procedure Laterality Date    CARPAL TUNNEL RELEASE      HERNIA REPAIR      MOHS SURGERY      ID HEMORRHOIDECTOMY,INT/EXT, 2+ COLUMNS/GROUPS N/A 4/17/2018    Procedure: EXTENSIVE internal and external HEMORRHOIDECTOMY EXCISION;  Surgeon: Erma Chew MD;  [facility]: [facility];  Service: Colorectal    SKIN BIOPSY      TONSILLECTOMY      TRIGGER FINGER RELEASE           Family History     Family History   Problem Relation Age of Onset    Basal cell carcinoma Brother     Squamous cell carcinoma Brother          Social History     Social History       Radiology

## 2021-05-26 ENCOUNTER — PROCEDURE VISIT (OUTPATIENT)
Dept: DERMATOLOGY | Facility: CLINIC | Age: 76
End: 2021-05-26
Payer: COMMERCIAL

## 2021-05-26 VITALS
DIASTOLIC BLOOD PRESSURE: 60 MMHG | HEIGHT: 67 IN | BODY MASS INDEX: 26.53 KG/M2 | TEMPERATURE: 98 F | SYSTOLIC BLOOD PRESSURE: 120 MMHG | RESPIRATION RATE: 18 BRPM | WEIGHT: 169 LBS | HEART RATE: 64 BPM

## 2021-05-26 DIAGNOSIS — C44.41 BCC (BASAL CELL CARCINOMA), SCALP/NECK: ICD-10-CM

## 2021-05-26 PROCEDURE — 12032 INTMD RPR S/A/T/EXT 2.6-7.5: CPT | Performed by: DERMATOLOGY

## 2021-05-26 PROCEDURE — 17311 MOHS 1 STAGE H/N/HF/G: CPT | Performed by: DERMATOLOGY

## 2021-05-26 NOTE — PATIENT INSTRUCTIONS
Mohs Microscopic Surgery After Care    WOUND CARE AFTER SURGERY:    1  Do NOT to remove the pressure bandage for 48 hours  Keep the area clean and dry while this bandage is on  2  After removing the bandage for the first time, gently clean the area with soap and water  If the bandage is difficult to remove, getting the bandage wet in the shower will sometimes help soften the adhesive and allow it to be removed more easily  3  You will now need to cleanse this area daily in the shower with gentle soap  There is no need to scrub the area  Apply plain Vaseline ointment (this is over the counter and not a prescription) to the site followed by a clean appropriately sized bandage to area  Non stick dressing and paper tape (or Hypafix) are recommended for sensitive skin but a bandaid is fine if it covers the area well  4  You will need to continue the above daily wound care until you return for suture removal in 14 days (generally 7 days for the face, 10-14 days off the face)      RESTRICTIONS:     For two DAYS:   - You will need to take it very easy as this time is highest risk for bleeding  Being a "couch potato" during these two days is generally recommended  - For surgeries on the face/neck/scalp: Avoid leaning down to pick things up off the floor as this brings blood up to your head  Instead, squat down to pick things up  For two WEEKS:   - No heavy lifting (anything greater than 10 pounds)   - You can start to do slow, gentle activities such as slow walking but nothing to increase your heart rate and blood pressure too much (such as cardiovascular exercise)  It is important to take it easy as there is still a risk for bleeding and a high risk popping of stitches open during this time  If we did surgery near the eyes (including the nose, forehead, front part of your scalp, cheeks): It is VERY common to get a large amount of swelling around your eyes (puffy eyes)   Although less frequent, this can be enough to swell your eyes shut and can also come along with bruising  This should not hurt and is very expected and normal  It is typically worst at ~ 3 days out from your surgery and dramatically better 1 week post-operatively  If we did surgery around your nose: No blowing your nose as this puts you at higher risk of popping stitches durign this time  Instead dab under your nose with a tissue or use a Q-tip inside your nose  If we did surgery on the skin above or bellow your lip or your lip itself: No sipping from straws as this uses a lot of the muscles around your mouth and increases the risk of popping stitches during this time  MANAGING YOUR PAIN AFTER SURGERY     You can expect to have some pain after surgery  This is normal  The pain is typically worse the first two days after surgery, and quickly begins to get better  The best strategy for controlling your pain after surgery is around the clock pain control  You can take over the counter Acetaminophen (Tylenol) for discomfort, if no contraindications  If you are taking this at the maximum dose, you can alternate this with Motrin (ibuprofen or Advil) as well  Alternating these medications with each other allows you to maximize your pain control  In addition to Tylenol and Motrin, you can use heating pads or ice packs on your incisions to help reduce your pain  How will I alternate your regular strength over-the-counter pain medication? You will take a dose of pain medication every three hours   Start by taking 650 mg of Tylenol (2 pills of 325 mg)   3 hours later take 600 mg of Motrin (3 pills of 200 mg)   3 hours after taking the Motrin take 650 mg of Tylenol   3 hours after that take 600 mg of Motrin      See example - if your first dose of Tylenol is at 12:00 PM     12:00 PM  Tylenol 650 mg (2 pills of 325 mg)    3:00 PM  Motrin 600 mg (3 pills of 200 mg)    6:00 PM  Tylenol 650 mg (2 pills of 325 mg)    9:00 PM  Motrin 600 mg (3 pills of 200 mg)    Continue alternating every 3 hours      Important:   Do not take more than 4000mg of Tylenol or 3200mg of Motrin in a 24-hour period  What if I still have pain? If you have pain that is not controlled with the over-the-counter pain medications (Tylenol and Motrin or Advil), don't hesitate to call our staff using the number provided  We will help make sure you are managing your pain in the best way possible, and if necessary, we can provide a prescription for additional pain medication  CALL OUR OFFICE IMMEDIATELY FOR ANY SIGNS OF INFECTION:    This includes fever, chills, increased redness, warmth, tenderness, severe discomfort/pain, or pus or foul smell coming from the wound  [facility] directly at [phone] (SKIN)    IF BLEEDING IS NOTICED:    Place a clean cloth over the area and apply firm pressure for thirty minutes  Check the wound ONLY after 30 minutes of direct pressure; do not cheat and sneak a peak, as that does not count  If bleeding persists after 30 minutes of legitimate direct pressure, then try one more round of direct pressure to the area  Should the bleeding become heavier or not stop after the second attempt, call [facility] directly at [phone] (SKIN)  Your call will get routed to the dermatology surgeon on call even after hours

## 2021-05-26 NOTE — LETTER
May 26, 2021     Ayana Lopez MD  [address] [address]    Patient: Peace Swanson   YOB: 1945   Date of Visit: 5/26/2021       Dear Dr Venkatesh Carr: Thank you for referring Clifford Gamble to me for evaluation  Below are my notes for this consultation  If you have questions, please do not hesitate to call me  I look forward to following your patient along with you           Sincerely,        Migdalia Briceño MD        CC: No Recipients

## 2021-05-26 NOTE — PROGRESS NOTES
MOHS Procedure Note    Patient: Anh Pineda  : 1945  MRN: [id number]  Date: 2021    History of Present Illness: The patient is a 66 y o  male who presents with complaints of basal cell carcinoma of the vertex of scalp  Past Medical History:   Diagnosis Date    Basal cell carcinoma 2021    vertex    BCC (basal cell carcinoma of skin) 2021    left upper back    BPH (benign prostatic hyperplasia)        Past Surgical History:   Procedure Laterality Date    CARPAL TUNNEL RELEASE      HERNIA REPAIR      MOHS SURGERY  2021    Vertex-Senft    UT HEMORRHOIDECTOMY,INT/EXT, 2+ COLUMNS/GROUPS N/A 2018    Procedure: EXTENSIVE internal and external HEMORRHOIDECTOMY EXCISION;  Surgeon: Linh Gil MD;  [facility];  Service: Colorectal    SKIN BIOPSY      TONSILLECTOMY      TRIGGER FINGER RELEASE           Current Outpatient Medications:     B Complex Vitamins (VITAMIN B-COMPLEX PO), Take by mouth, Disp: , Rfl:     Cholecalciferol (VITAMIN D-3 PO), Take by mouth, Disp: , Rfl:     GLUCOSAMINE-CHONDROITIN-CA-D3 PO, Take by mouth, Disp: , Rfl:     Multiple Vitamin (MULTIVITAMIN) tablet, Take 1 tablet by mouth daily, Disp: , Rfl:     finasteride (PROSCAR) 5 mg tablet, Take 1 tablet (5 mg total) by mouth daily (Patient not taking: Reported on 2021), Disp: 90 tablet, Rfl: 1    tamsulosin (FLOMAX) 0 4 mg, Take 1 capsule (0 4 mg total) by mouth daily with dinner (Patient not taking: Reported on 2021), Disp: 90 capsule, Rfl: 3    No Known Allergies    Physical Exam:   Vitals:    21 0950   BP: 120/60   Pulse: 64   Resp: 18   Temp: 98 °F (36 7 °C)     General: Awake, Alert, Oriented x 3, Mood and affect appropriate  Respiratory: Respirations even and unlabored  Cardiovascular: Peripheral pulses intact; no edema  Musculoskeletal Exam: N/A    Assessment: 1 1 cm pink macule, biopsy confirmed basal cell carcinoma    Plan:  Mohs    MOHS Procedure Timeout Most Recent Value   Timeout:  1005   Patient Identity Verified:  Yes   Correct Site Verified:  Yes   Correct Procedure Verified:  Yes          MOHS Diagnosis/Indication/Location/ID      Most Recent Value   Pathology Type  Basal cell carcinoma   Anatomic Site  -- [Vertex scalp]   Indications for MOHS  tumor location, porrly defined tumor margins   MOHS ID  [id number]          MOHS Site/Accession/Pre-Post      Most Recent Value   Original Site Identified (as submitted by referring clinician)  Photo   Biopsy Accession/Specimen # (as submitted by referring clincian)  [id number]   Pre-MOHS Size Length (cm)  1 1   Pre-MOHS Size Width (cm)  1 1   Post-MOHS Size-Length (cm)  1 5   Post MOHS Size-Width (cm)  1 5   Repair Type  Intermediate layered closure   Suture Type  Ethilon, Vicryl   Ethilon Suture Size  4   Vicryl Suture Size  4   Final repair length (cm):  2 7   Anesthetic Used  1% Lidocaine with epinephrine [with Bicarb]          MOHS Tumor Stage 1 Information      Most Recent Value   Tissue Sections (blocks)  2   Microscopic Exam Section 1:  No tumor identified in section  [post curettage]   Microscopic Exam Section 2:  No tumor identified in section  [post curretage]   Tumor Clear After Stage I? Yes                With the patient in the supine position and under adequate local anesthesia with 1% lidocaine with epinephrine 1:100,000, the defect was scrubbed with Hibiclens  Sterile drapes were placed from the sterile tray  Because of the location of the surgical defect, an intermediate closure was judged to give the best possible cosmetic and functional result  The edges of the defect were carefully debrided removing any dead or coagulated tissue  Underming was in subgaleal plane and closure was with both deep cuticular and cuticular sutures  Hemostasis was obtained by pinpoint electrocoagulation    Careful planning of removal of redundant tissue at either end of the defect was drawn out so that the suture lines would fall in the optimal orientation with regard to the relaxed skin tension lines  These were then removed with a #15 blade scalpel  The wound was then approximated by a deep layer of buried vertical mattress sutures and the cutaneous margins were approximated and closed by superficial sutures as noted above  Estimated blood loss was less than 5 mL  The patient tolerated the procedure well  The wound was dressed with petrolatum, a non-stick pad, and a compression dressing  Anju Nice MD served as the surgeon and pathologist during the procedure  Postoperative care: Wound care discussed at length  I urged the patient to call us if any problems or question should arise  Complications: none  Post-op medications: none  Patient condition after procedure: stable  Discharge plans: Plan for suture removal 7 days, at next scheduled appointment   Patient identified, procedure verified, site identified and verified  Time out completed  Surgical removal of the lesion discussed with the patient (risks and benefits, including possibility of scarring, infection, recurrence or potential for further treatment)  I have specifically identified the site with the patient  I have discussed the fact that the patient will have a scar after the procedure regardless of granulation or repair with sutures  I have discussed that the repair options can range from granulation in some cases to linear or curvilinear closures to larger flaps or grafts  There are sometimes flaps or grafts used that require multiples stages of surgery and will not be completed today, rather be completed over a series of appointments  I have discussed that occasionally due to location, size or depth of the lesion I may recommend consultation with and transfer of care for further removal or the reconstruction to another provider such as ophthalmology surgery, plastic surgery, ENT surgery, or surgical oncology   There are cases in which other testing such as imaging with MRI or CT scan or testing of lymph nodes is recommended because of the nature/depth/location of tumor seen during the removal  There is a risk of injury to nerves causing temporary or permanent numbness or the inability to move muscles full such as the inability to lift eyebrows  Questions answered and verbal and written consent was obtained  The tumor qualifies for Mohs based on AUC criteria  Dr Kanchan Whiting served as the surgeon and pathologist during the procedure  Postoperative care: No would care should be necessary prior to the next visit but I urged the patient to call us if any problems or question should arise prior to that time  If circumstances should change, we will contact the patient to make other arrangements       Scribe Attestation    I,:  Fam Black MA am acting as a scribe while in the presence of the attending physician :       I,:  Estrada Gaspar MD personally performed the services described in this documentation    as scribed in my presence :

## 2021-06-09 ENCOUNTER — OFFICE VISIT (OUTPATIENT)
Dept: DERMATOLOGY | Facility: CLINIC | Age: 76
End: 2021-06-09

## 2021-06-09 DIAGNOSIS — Z48.02 ENCOUNTER FOR REMOVAL OF SUTURES: Primary | ICD-10-CM

## 2021-06-09 PROCEDURE — RECHECK: Performed by: DERMATOLOGY

## 2021-06-09 NOTE — PROGRESS NOTES
Suture removal    Date/Time: 6/9/2021 1:20 PM  Performed by: Cyn Puga RN  Authorized by: Brandon Bejarano MD   Universal Protocol:  Consent: Verbal consent obtained  Consent given by: patient  Timeout called at: 6/9/2021 1:20 PM   Patient understanding: patient states understanding of the procedure being performed  Patient consent: the patient's understanding of the procedure matches consent given  Procedure consent: procedure consent matches procedure scheduled  Relevant documents: relevant documents present and verified  Test results: test results not available  Site marked: the operative site was not marked  Radiology Images displayed and confirmed  If images not available, report reviewed: imaging studies not available  Patient identity confirmed: verbally with patient        Patient location:  Clinic  Location:     Location:  [address] [facility] location:  Scalp (Vertex scalp )  Procedure details: Tools used:  Suture removal kit    Wound appearance:  No sign(s) of infection, good wound healing, clean and pink    Number of sutures removed:  6  Post-procedure details:     Post-procedure assessment: Vaseline applied     Patient tolerance of procedure:   Tolerated well, no immediate complications  Comments:      Patient instructed to follow up in 3-6 months with primary Dermatologist for a full body skin exam                Scribe Attestation    I,:  Cyn Puga RN am acting as a scribe while in the presence of the attending physician :       I,:  Brandon Bejarano MD personally performed the services described in this documentation    as scribed in my presence :

## 2021-08-19 DIAGNOSIS — N13.8 BPH WITH OBSTRUCTION/LOWER URINARY TRACT SYMPTOMS: ICD-10-CM

## 2021-08-19 DIAGNOSIS — N40.1 BPH WITH OBSTRUCTION/LOWER URINARY TRACT SYMPTOMS: ICD-10-CM

## 2021-08-19 RX ORDER — TAMSULOSIN HYDROCHLORIDE 0.4 MG/1
CAPSULE ORAL
Qty: 90 CAPSULE | Refills: 3 | Status: SHIPPED | OUTPATIENT
Start: 2021-08-19

## 2021-08-23 DIAGNOSIS — N13.8 BPH WITH OBSTRUCTION/LOWER URINARY TRACT SYMPTOMS: ICD-10-CM

## 2021-08-23 DIAGNOSIS — N40.1 BPH WITH OBSTRUCTION/LOWER URINARY TRACT SYMPTOMS: ICD-10-CM

## 2021-08-23 RX ORDER — TAMSULOSIN HYDROCHLORIDE 0.4 MG/1
CAPSULE ORAL
Qty: 90 CAPSULE | Refills: 0 | Status: CANCELLED | OUTPATIENT
Start: 2021-08-23

## 2021-08-23 NOTE — TELEPHONE ENCOUNTER
I called patient regarding his second request for Tamsulosin  This medication was refilled on 08/19/21 90 day supply with 3 refills and pharmacy confirmed receipt  I told patient to give them a call to check the status since [facility] was [facility] and it does take a few days for patient to receive medication refills

## 2021-10-22 ENCOUNTER — TELEPHONE (OUTPATIENT)
Dept: OTHER | Facility: OTHER | Age: 76
End: 2021-10-22

## 2021-11-02 ENCOUNTER — HOSPITAL ENCOUNTER (OUTPATIENT)
Dept: VASCULAR ULTRASOUND | Facility: HOSPITAL | Age: 76
Discharge: HOME | End: 2021-11-02
Attending: FAMILY MEDICINE
Payer: COMMERCIAL

## 2021-11-02 DIAGNOSIS — R09.89 BRUIT: ICD-10-CM

## 2021-11-02 PROCEDURE — 93880 EXTRACRANIAL BILAT STUDY: CPT | Performed by: SURGERY

## 2021-11-02 PROCEDURE — 93880 EXTRACRANIAL BILAT STUDY: CPT

## 2021-11-18 ENCOUNTER — OFFICE VISIT (OUTPATIENT)
Dept: UROLOGY | Facility: CLINIC | Age: 76
End: 2021-11-18
Payer: COMMERCIAL

## 2021-11-18 VITALS
WEIGHT: 168 LBS | SYSTOLIC BLOOD PRESSURE: 130 MMHG | DIASTOLIC BLOOD PRESSURE: 78 MMHG | BODY MASS INDEX: 26.37 KG/M2 | HEIGHT: 67 IN

## 2021-11-18 DIAGNOSIS — R97.20 ELEVATED PSA: Primary | ICD-10-CM

## 2021-11-18 PROCEDURE — 99213 OFFICE O/P EST LOW 20 MIN: CPT | Performed by: PHYSICIAN ASSISTANT

## 2021-12-02 ENCOUNTER — EVALUATION (OUTPATIENT)
Dept: PHYSICAL THERAPY | Facility: CLINIC | Age: 76
End: 2021-12-02
Payer: COMMERCIAL

## 2021-12-02 DIAGNOSIS — M25.512 CHRONIC LEFT SHOULDER PAIN: Primary | ICD-10-CM

## 2021-12-02 DIAGNOSIS — G89.29 CHRONIC LEFT SHOULDER PAIN: Primary | ICD-10-CM

## 2021-12-02 PROCEDURE — 97162 PT EVAL MOD COMPLEX 30 MIN: CPT | Performed by: PHYSICAL THERAPIST

## 2021-12-02 PROCEDURE — 97110 THERAPEUTIC EXERCISES: CPT | Performed by: PHYSICAL THERAPIST

## 2021-12-07 ENCOUNTER — OFFICE VISIT (OUTPATIENT)
Dept: PHYSICAL THERAPY | Facility: CLINIC | Age: 76
End: 2021-12-07
Payer: COMMERCIAL

## 2021-12-07 DIAGNOSIS — M25.512 CHRONIC LEFT SHOULDER PAIN: Primary | ICD-10-CM

## 2021-12-07 DIAGNOSIS — G89.29 CHRONIC LEFT SHOULDER PAIN: Primary | ICD-10-CM

## 2021-12-07 PROCEDURE — 97110 THERAPEUTIC EXERCISES: CPT

## 2021-12-15 ENCOUNTER — OFFICE VISIT (OUTPATIENT)
Dept: PHYSICAL THERAPY | Facility: CLINIC | Age: 76
End: 2021-12-15
Payer: COMMERCIAL

## 2021-12-15 DIAGNOSIS — M25.512 CHRONIC LEFT SHOULDER PAIN: Primary | ICD-10-CM

## 2021-12-15 DIAGNOSIS — G89.29 CHRONIC LEFT SHOULDER PAIN: Primary | ICD-10-CM

## 2021-12-15 PROCEDURE — 97110 THERAPEUTIC EXERCISES: CPT

## 2021-12-22 ENCOUNTER — OFFICE VISIT (OUTPATIENT)
Dept: PHYSICAL THERAPY | Facility: CLINIC | Age: 76
End: 2021-12-22
Payer: COMMERCIAL

## 2021-12-22 DIAGNOSIS — G89.29 CHRONIC LEFT SHOULDER PAIN: Primary | ICD-10-CM

## 2021-12-22 DIAGNOSIS — M25.512 CHRONIC LEFT SHOULDER PAIN: Primary | ICD-10-CM

## 2021-12-22 PROCEDURE — 97530 THERAPEUTIC ACTIVITIES: CPT | Performed by: PHYSICAL THERAPIST

## 2021-12-22 PROCEDURE — 97110 THERAPEUTIC EXERCISES: CPT | Performed by: PHYSICAL THERAPIST

## 2021-12-28 ENCOUNTER — OFFICE VISIT (OUTPATIENT)
Dept: PHYSICAL THERAPY | Facility: CLINIC | Age: 76
End: 2021-12-28
Payer: COMMERCIAL

## 2021-12-28 DIAGNOSIS — M25.512 CHRONIC LEFT SHOULDER PAIN: Primary | ICD-10-CM

## 2021-12-28 DIAGNOSIS — G89.29 CHRONIC LEFT SHOULDER PAIN: Primary | ICD-10-CM

## 2021-12-28 PROCEDURE — 97110 THERAPEUTIC EXERCISES: CPT

## 2022-02-27 ENCOUNTER — HOSPITAL ENCOUNTER (EMERGENCY)
Facility: HOSPITAL | Age: 77
Discharge: HOME | End: 2022-02-27
Attending: EMERGENCY MEDICINE | Admitting: EMERGENCY MEDICINE
Payer: COMMERCIAL

## 2022-02-27 VITALS
SYSTOLIC BLOOD PRESSURE: 124 MMHG | RESPIRATION RATE: 16 BRPM | DIASTOLIC BLOOD PRESSURE: 78 MMHG | OXYGEN SATURATION: 97 % | TEMPERATURE: 97.4 F | HEART RATE: 64 BPM

## 2022-02-27 DIAGNOSIS — R42 VERTIGO: Primary | ICD-10-CM

## 2022-02-27 LAB
ALBUMIN SERPL BCP-MCNC: 4.1 G/DL (ref 3.5–5)
ALP SERPL-CCNC: 73 U/L (ref 46–116)
ALT SERPL W P-5'-P-CCNC: 22 U/L (ref 12–78)
ANION GAP SERPL CALCULATED.3IONS-SCNC: 9 MMOL/L (ref 4–13)
AST SERPL W P-5'-P-CCNC: 11 U/L (ref 5–45)
BASOPHILS # BLD AUTO: 0.05 THOUSANDS/ΜL (ref 0–0.1)
BASOPHILS NFR BLD AUTO: 1 % (ref 0–1)
BILIRUB SERPL-MCNC: 0.37 MG/DL (ref 0.2–1)
BUN SERPL-MCNC: 17 MG/DL (ref 5–25)
CALCIUM SERPL-MCNC: 8.9 MG/DL (ref 8.3–10.1)
CARDIAC TROPONIN I PNL SERPL HS: <2 NG/L
CARDIAC TROPONIN I PNL SERPL HS: <2 NG/L
CHLORIDE SERPL-SCNC: 106 MMOL/L (ref 100–108)
CO2 SERPL-SCNC: 27 MMOL/L (ref 21–32)
CREAT SERPL-MCNC: 0.87 MG/DL (ref 0.6–1.3)
EOSINOPHIL # BLD AUTO: 0.14 THOUSAND/ΜL (ref 0–0.61)
EOSINOPHIL NFR BLD AUTO: 2 % (ref 0–6)
ERYTHROCYTE [DISTWIDTH] IN BLOOD BY AUTOMATED COUNT: 13 % (ref 11.6–15.1)
GFR SERPL CREATININE-BSD FRML MDRD: 82 ML/MIN/1.73SQ M
GLUCOSE SERPL-MCNC: 148 MG/DL (ref 65–140)
HCT VFR BLD AUTO: 47.5 % (ref 36.5–49.3)
HGB BLD-MCNC: 15.5 G/DL (ref 12–17)
IMM GRANULOCYTES # BLD AUTO: 0.02 THOUSAND/UL (ref 0–0.2)
IMM GRANULOCYTES NFR BLD AUTO: 0 % (ref 0–2)
LYMPHOCYTES # BLD AUTO: 1.53 THOUSANDS/ΜL (ref 0.6–4.47)
LYMPHOCYTES NFR BLD AUTO: 17 % (ref 14–44)
MCH RBC QN AUTO: 31.5 PG (ref 26.8–34.3)
MCHC RBC AUTO-ENTMCNC: 32.6 G/DL (ref 31.4–37.4)
MCV RBC AUTO: 97 FL (ref 82–98)
MONOCYTES # BLD AUTO: 0.61 THOUSAND/ΜL (ref 0.17–1.22)
MONOCYTES NFR BLD AUTO: 7 % (ref 4–12)
NEUTROPHILS # BLD AUTO: 6.46 THOUSANDS/ΜL (ref 1.85–7.62)
NEUTS SEG NFR BLD AUTO: 73 % (ref 43–75)
NRBC BLD AUTO-RTO: 0 /100 WBCS
PLATELET # BLD AUTO: 259 THOUSANDS/UL (ref 149–390)
PMV BLD AUTO: 10.2 FL (ref 8.9–12.7)
POTASSIUM SERPL-SCNC: 4.2 MMOL/L (ref 3.5–5.3)
PROT SERPL-MCNC: 7.7 G/DL (ref 6.4–8.2)
RBC # BLD AUTO: 4.92 MILLION/UL (ref 3.88–5.62)
SODIUM SERPL-SCNC: 142 MMOL/L (ref 136–145)
WBC # BLD AUTO: 8.81 THOUSAND/UL (ref 4.31–10.16)

## 2022-02-27 PROCEDURE — 99284 EMERGENCY DEPT VISIT MOD MDM: CPT

## 2022-02-27 PROCEDURE — 93005 ELECTROCARDIOGRAM TRACING: CPT

## 2022-02-27 PROCEDURE — 85025 COMPLETE CBC W/AUTO DIFF WBC: CPT | Performed by: EMERGENCY MEDICINE

## 2022-02-27 PROCEDURE — 36415 COLL VENOUS BLD VENIPUNCTURE: CPT | Performed by: EMERGENCY MEDICINE

## 2022-02-27 PROCEDURE — 99285 EMERGENCY DEPT VISIT HI MDM: CPT | Performed by: EMERGENCY MEDICINE

## 2022-02-27 PROCEDURE — 84484 ASSAY OF TROPONIN QUANT: CPT | Performed by: EMERGENCY MEDICINE

## 2022-02-27 PROCEDURE — 80053 COMPREHEN METABOLIC PANEL: CPT | Performed by: EMERGENCY MEDICINE

## 2022-02-27 PROCEDURE — 96374 THER/PROPH/DIAG INJ IV PUSH: CPT

## 2022-02-27 RX ORDER — MECLIZINE HCL 12.5 MG/1
25 TABLET ORAL ONCE
Status: COMPLETED | OUTPATIENT
Start: 2022-02-27 | End: 2022-02-27

## 2022-02-27 RX ORDER — ONDANSETRON 2 MG/ML
4 INJECTION INTRAMUSCULAR; INTRAVENOUS ONCE
Status: COMPLETED | OUTPATIENT
Start: 2022-02-27 | End: 2022-02-27

## 2022-02-27 RX ORDER — MECLIZINE HYDROCHLORIDE 25 MG/1
25 TABLET ORAL 3 TIMES DAILY PRN
Qty: 30 TABLET | Refills: 0 | Status: SHIPPED | OUTPATIENT
Start: 2022-02-27

## 2022-02-27 RX ADMIN — ONDANSETRON 4 MG: 2 INJECTION INTRAMUSCULAR; INTRAVENOUS at 13:57

## 2022-02-27 RX ADMIN — MECLIZINE 25 MG: 12.5 TABLET ORAL at 13:58

## 2022-02-27 NOTE — ED ATTENDING ATTESTATION
2/27/2022  IShmuel DO, saw and evaluated the patient  I have discussed the patient with the resident/non-physician practitioner and agree with the resident's/non-physician practitioner's findings, Plan of Care, and MDM as documented in the resident's/non-physician practitioner's note, except where noted  All available labs and Radiology studies were reviewed  I was present for key portions of any procedure(s) performed by the resident/non-physician practitioner and I was immediately available to provide assistance  At this point I agree with the current assessment done in the Emergency Department  I have conducted an independent evaluation of this patient a history and physical is as follows:    Patient is a 53-year-old male who presents with dizziness  Patient states he woke up this morning and had acute onset of dizziness upon getting out of bed  He describes a sensation of room spinning  He states that his symptoms resolve when he has his eyes closed or head stay still  His symptoms worsen with movement  He denies any associated numbness, tingling, weakness, speech difficulty or other complaints  He does have a history of peripheral vertigo, but last episode was over 10 years ago  On exam, patient is in no acute distress  Pupils equally round and reactive to light  Horizontal nystagmus when looking to the left and to the right  Cranial nerves 2-12 grossly intact  Motor, sensation normal   Heart is bradycardic, regular rhythm  Breath sounds normal     Etna-Hallpike maneuver induced horizontal nystagmus with right ear down more than with the left ear down  Nystagmus fatigable  Patient received Zofran and meclizine  He states that his symptoms have  He was able to ambulate independently to and from the restroom  There is no ataxia  Patient has no focal neurologic deficits  I suspect peripheral vertigo as the cause of his intermittent vertigo    Recommended follow-up with ENT and vestibular therapy  Patient agrees to follow-up and return to ED sooner if symptoms recur or he develops other neurologic symptoms  He is well-appearing and stable for discharge  Portions of the above record have been created with voice recognition software  Occasional wrong word or "sound alike" substitutions may have occurred due to the inherent limitations of voice recognition software  Read the chart carefully and recognize, using context, where substitutions may have occurred  ED Course  ED Course as of 02/27/22 1538   Sun Feb 27, 2022   1500 Patient states that he feels much better  He was able to ambulate to the restroom independently  [id number] Patient states he ambulated back from the restroom and feels at baseline  He denies any vertigo at this time  He is comfortable with discharge in outpatient follow-up           Critical Care Time  Procedures

## 2022-02-27 NOTE — ED PROVIDER NOTES
History  Chief Complaint   Patient presents with    Dizziness     C/o dizziness starting this AM  Last episode of vertigo about 10 years ago  Emanuel Doctor is a 40-year-old male with PMH of BPH presents the ED with vertigo that started this morning when he awoke from sleep  The patient has had several episodes of vomiting and nausea since this started this morning  He states that his vertigo was made worse with head movement and has had no difficulty swallowing, double vision, difficulty speaking  The patient denies chest pain, SOB, abdominal pain, lightheadedness, difficulty hearing above baseline, bowel or bladder symptoms, or any other abnormality other than vertigo and nausea  The patient experienced vertigo similar to this 15 years ago but resolved quickly  Patient is otherwise healthy and takes very few medications  Patient states that if he holds his head completely still or closes eyes his symptoms are greatly improved and almost completely resolve  Prior to Admission Medications   Prescriptions Last Dose Informant Patient Reported? Taking?    B Complex Vitamins (VITAMIN B-COMPLEX PO)  Self Yes No   Sig: Take by mouth   Cholecalciferol (VITAMIN D-3 PO)  Self Yes No   Sig: Take by mouth   GLUCOSAMINE-CHONDROITIN-CA-D3 PO  Self Yes No   Sig: Take by mouth   Multiple Vitamin (MULTIVITAMIN) tablet  Self Yes No   Sig: Take 1 tablet by mouth daily   finasteride (PROSCAR) 5 mg tablet  Self No No   Sig: Take 1 tablet (5 mg total) by mouth daily   Patient not taking: Reported on 5/26/2021   tamsulosin (FLOMAX) 0 4 mg  Self No No   Sig: TAKE 1 CAPSULE DAILY WITH DINNER      Facility-Administered Medications: None       Past Medical History:   Diagnosis Date    Basal cell carcinoma 03/24/2021    vertex    BCC (basal cell carcinoma of skin) 03/24/2021    left upper back    BPH (benign prostatic hyperplasia)        Past Surgical History:   Procedure Laterality Date    CARPAL TUNNEL RELEASE      HERNIA REPAIR      MOHS SURGERY  05/26/2021    Vertex-Senft    [place] HEMORRHOIDECTOMY,INT/EXT, 2+ COLUMNS/GROUPS N/A 4/17/2018    Procedure: EXTENSIVE internal and external HEMORRHOIDECTOMY EXCISION;  Surgeon: Danica Traore MD;  Location: [facility];  Service: Colorectal    SKIN BIOPSY      TONSILLECTOMY      TRIGGER FINGER RELEASE         Family History   Problem Relation Age of Onset    Basal cell carcinoma Brother     Squamous cell carcinoma Brother      I have reviewed and agree with the history as documented  E-Cigarette/Vaping    E-Cigarette Use Never User      E-Cigarette/Vaping Substances    Nicotine No     THC No     CBD No     Flavoring No     Other No      Social History     Tobacco Use    Smoking status: Current Some Day Smoker    Smokeless tobacco: Never Used    Tobacco comment: 1 or2 a day   Vaping Use    Vaping Use: Never used   Substance Use Topics    Alcohol use: Yes     Comment: seldome    Drug use: No        Review of Systems   Constitutional: Negative for chills and fever  HENT: Negative for ear pain and sore throat  Eyes: Negative for pain and visual disturbance  Respiratory: Negative for cough and shortness of breath  Cardiovascular: Negative for chest pain and palpitations  Gastrointestinal: Positive for nausea and vomiting  Negative for abdominal pain  Genitourinary: Negative for dysuria and hematuria  Musculoskeletal: Negative for arthralgias and back pain  Skin: Negative for color change and rash  Neurological: Positive for dizziness  Negative for seizures, syncope, light-headedness and headaches  All other systems reviewed and are negative        Physical Exam  ED Triage Vitals [02/27/22 1157]   Temperature Pulse Respirations Blood Pressure SpO2   (!) 97 4 °F (36 3 °C) (!) 53 16 124/78 97 %      Temp Source Heart Rate Source Patient Position - Orthostatic VS BP Location FiO2 (%)   Oral Monitor Sitting Right arm --      Pain Score       No Pain Orthostatic Vital Signs  Vitals:    02/27/22 1157 02/27/22 1400   BP: 124/78    Pulse: (!) 53 64   Patient Position - Orthostatic VS: Sitting        Physical Exam  Vitals and nursing note reviewed  Constitutional:       General: He is not in acute distress  Appearance: He is well-developed  He is not ill-appearing  HENT:      Head: Normocephalic and atraumatic  Eyes:      Extraocular Movements: Extraocular movements intact  Right eye: Nystagmus present  Normal extraocular motion  Left eye: Nystagmus present  Normal extraocular motion  Conjunctiva/sclera: Conjunctivae normal    Cardiovascular:      Rate and Rhythm: Normal rate and regular rhythm  Pulses: Normal pulses  Heart sounds: Normal heart sounds  No murmur heard  Pulmonary:      Effort: Pulmonary effort is normal  No respiratory distress  Breath sounds: Normal breath sounds  No wheezing, rhonchi or rales  Abdominal:      General: Abdomen is flat  Palpations: Abdomen is soft  Tenderness: There is no abdominal tenderness  There is no guarding or rebound  Musculoskeletal:         General: No swelling, tenderness or deformity  Normal range of motion  Cervical back: Normal range of motion and neck supple  Right lower leg: No edema  Left lower leg: No edema  Skin:     General: Skin is warm and dry  Capillary Refill: Capillary refill takes less than 2 seconds  Neurological:      General: No focal deficit present  Mental Status: He is alert and oriented to person, place, and time  Cranial Nerves: No cranial nerve deficit  Sensory: No sensory deficit  Motor: No weakness  Coordination: Coordination normal       Gait: Gait normal       Comments: Mild unsteadiness with walking initially  Improved over time           ED Medications  Medications   ondansetron (ZOFRAN) injection 4 mg (4 mg Intravenous Given 2/27/22 1357)   meclizine (ANTIVERT) tablet 25 mg (25 mg Oral Given 2/27/22 3228)       Diagnostic Studies  Results Reviewed     Procedure Component Value Units Date/Time    HS Troponin I 2hr [[id number]] Collected: 02/27/22 1424    Lab Status: Final result Specimen: Blood from Arm, Right Updated: 02/27/22 1503     hs TnI 2hr <2 ng/L      Delta 2hr hsTnI --    HS Troponin I 4hr [[id number]]     Lab Status: No result Specimen: Blood     Comprehensive metabolic panel [[id number]]  (Abnormal) Collected: 02/27/22 1226    Lab Status: Final result Specimen: Blood from Arm, Right Updated: 02/27/22 1325     Sodium 142 mmol/L      Potassium 4 2 mmol/L      Chloride 106 mmol/L      CO2 27 mmol/L      ANION GAP 9 mmol/L      BUN 17 mg/dL      Creatinine 0 87 mg/dL      Glucose 148 mg/dL      Calcium 8 9 mg/dL      AST 11 U/L      ALT 22 U/L      Alkaline Phosphatase 73 U/L      Total Protein 7 7 g/dL      Albumin 4 1 g/dL      Total Bilirubin 0 37 mg/dL      eGFR 82 ml/min/1 73sq m     Narrative:      Anthony guidelines for Chronic Kidney Disease (CKD):     Stage 1 with normal or high GFR (GFR > 90 mL/min/1 73 square meters)    Stage 2 Mild CKD (GFR = 60-89 mL/min/1 73 square meters)    Stage 3A Moderate CKD (GFR = 45-59 mL/min/1 73 square meters)    Stage 3B Moderate CKD (GFR = 30-44 mL/min/1 73 square meters)    Stage 4 Severe CKD (GFR = 15-29 mL/min/1 73 square meters)    Stage 5 End Stage CKD (GFR <15 mL/min/1 73 square meters)  Note: GFR calculation is accurate only with a steady state creatinine    HS Troponin 0hr (reflex protocol) [[id number]]  (Normal) Collected: 02/27/22 1226    Lab Status: Final result Specimen: Blood from Arm, Right Updated: 02/27/22 1259     hs TnI 0hr <2 ng/L     CBC and differential [[id number]] Collected: 02/27/22 1226    Lab Status: Final result Specimen: Blood from Arm, Right Updated: 02/27/22 1236     WBC 8 81 Thousand/uL      RBC 4 92 Million/uL      Hemoglobin 15 5 g/dL      Hematocrit 47 5 %      MCV 97 fL      MCH 31 5 pg      MCHC 32 6 g/dL      RDW 13 0 %      MPV 10 2 fL      Platelets 261 Thousands/uL      nRBC 0 /100 WBCs      Neutrophils Relative 73 %      Immat GRANS % 0 %      Lymphocytes Relative 17 %      Monocytes Relative 7 %      Eosinophils Relative 2 %      Basophils Relative 1 %      Neutrophils Absolute 6 46 Thousands/µL      Immature Grans Absolute 0 02 Thousand/uL      Lymphocytes Absolute 1 53 Thousands/µL      Monocytes Absolute 0 61 Thousand/µL      Eosinophils Absolute 0 14 Thousand/µL      Basophils Absolute 0 05 Thousands/µL                  No orders to display         Procedures  ECG 12 Lead Documentation Only    Date/Time: 2/27/2022 5:47 PM  Performed by: Tano Chacon DO  Authorized by: Tano Chacon DO     Indications / Diagnosis:  Vertigo  ECG reviewed by me, the ED Provider: yes    Patient location:  ED  Previous ECG:     Previous ECG:  Compared to current    Similarity:  No change  Interpretation:     Interpretation: normal    Rate:     ECG rate:  51    ECG rate assessment: bradycardic    Rhythm:     Rhythm: sinus bradycardia    Ectopy:     Ectopy: none    QRS:     QRS axis:  Normal    QRS intervals:  Normal  Conduction:     Conduction: normal    ST segments:     ST segments:  Normal  T waves:     T waves: normal    Comments:      Normal sinus rhythm  No evidence of ischemia or dysrhythmia  ED Course                                       MDM  Number of Diagnoses or Management Options  Vertigo  Diagnosis management comments: 74M with PMH of BPH on tamsulosin presents to the ED with 1 day of vertigo that is worsened with head movement and improved with closing of eyes  A physical exam and Rene-Hallpike maneuver the patient has horizontal nystagmus during vertigo episodes but otherwise has no focal neurologic deficit    Additionally hints exam is performed and findings are suggestive of peripheral source of vertigo including abnormal head impulse, horizontal nystagmus, and normal test of skew  Workup in the ED includes CBC, CMP, HS troponin all of which are within normal limits  After administration of Zofran and Antivert the patient is able to ambulate to and from the bathroom without any difficulty  In the absence of concerning findings for central cause of vertigo and improvement of symptoms with Zofran and meclizine the patient is discharged home with prescription for meclizine  Disposition  Final diagnoses:   Vertigo     Time reflects when diagnosis was documented in both MDM as applicable and the Disposition within this note     Time User Action Codes Description Comment    2/27/2022  3:07 PM Zeny Mascorro Add [R42] Vertigo       ED Disposition     ED Disposition Condition Date/Time Comment    Discharge Stable Sun Feb 27, 2022  3:07  [address] discharge to home/self care              Follow-up Information     Follow up With Specialties Details Why Contact Fred Poon,  Family Medicine Schedule an appointment as soon as possible for a visit  Return to ED sooner if symptoms recur or you develop speech difficulty, numbness, tingling, weakness or other concerns [address] [facility]  [address] [facility] [address]      Shwetha Zhou MD Otolaryngology Schedule an appointment as soon as possible for a visit   [address] [facility] [address]            Discharge Medication List as of 2/27/2022  3:10 PM      START taking these medications    Details   meclizine (ANTIVERT) 25 mg tablet Take 1 tablet (25 mg total) by mouth 3 (three) times a day as needed for dizziness, Starting Sun 2/27/2022, Normal         CONTINUE these medications which have NOT CHANGED    Details   B Complex Vitamins (VITAMIN B-COMPLEX PO) Take by mouth, Historical Med      Cholecalciferol (VITAMIN D-3 PO) Take by mouth, Historical Med      finasteride (PROSCAR) 5 mg tablet Take 1 tablet (5 mg total) by mouth daily, Starting Fri 5/14/2021, Normal GLUCOSAMINE-CHONDROITIN-CA-D3 PO Take by mouth, Historical Med      Multiple Vitamin (MULTIVITAMIN) tablet Take 1 tablet by mouth daily, Historical Med      tamsulosin (FLOMAX) 0 4 mg TAKE 1 CAPSULE DAILY WITH DINNER, Normal           No discharge procedures on file  PDMP Review     None           ED Provider  Attending physically available and evaluated Bebeto Chinchilla I managed the patient along with the ED Attending      Electronically Signed by         Coni Mack DO  02/27/22 5477

## 2022-02-28 LAB
ATRIAL RATE: 53 BPM
P AXIS: 60 DEGREES
PR INTERVAL: 172 MS
QRS AXIS: -3 DEGREES
QRSD INTERVAL: 74 MS
QT INTERVAL: 430 MS
QTC INTERVAL: 396 MS
T WAVE AXIS: 41 DEGREES
VENTRICULAR RATE: 51 BPM

## 2022-02-28 PROCEDURE — 93010 ELECTROCARDIOGRAM REPORT: CPT | Performed by: INTERNAL MEDICINE

## 2022-05-27 ENCOUNTER — PREPPED CHART (OUTPATIENT)
Dept: URBAN - METROPOLITAN AREA CLINIC 6 | Facility: CLINIC | Age: 77
End: 2022-05-27

## 2022-06-02 ENCOUNTER — ESTABLISHED COMPREHENSIVE EXAM (OUTPATIENT)
Dept: URBAN - METROPOLITAN AREA CLINIC 6 | Facility: CLINIC | Age: 77
End: 2022-06-02

## 2022-06-02 DIAGNOSIS — H35.373: ICD-10-CM

## 2022-06-02 DIAGNOSIS — Z96.1: ICD-10-CM

## 2022-06-02 PROCEDURE — 92014 COMPRE OPH EXAM EST PT 1/>: CPT

## 2022-06-02 PROCEDURE — 92134 CPTRZ OPH DX IMG PST SGM RTA: CPT

## 2022-06-02 PROCEDURE — 92015 DETERMINE REFRACTIVE STATE: CPT

## 2022-06-02 ASSESSMENT — TONOMETRY
OS_IOP_MMHG: 10
OD_IOP_MMHG: 9

## 2022-06-02 ASSESSMENT — VISUAL ACUITY
OD_CC: 20/20
OS_CC: 20/30-1
OU_CC: J1+

## 2022-08-15 DIAGNOSIS — N13.8 BPH WITH OBSTRUCTION/LOWER URINARY TRACT SYMPTOMS: ICD-10-CM

## 2022-08-15 DIAGNOSIS — N40.1 BPH WITH OBSTRUCTION/LOWER URINARY TRACT SYMPTOMS: ICD-10-CM

## 2022-08-15 RX ORDER — TAMSULOSIN HYDROCHLORIDE 0.4 MG/1
CAPSULE ORAL
Qty: 90 CAPSULE | Refills: 3 | Status: SHIPPED | OUTPATIENT
Start: 2022-08-15

## 2022-09-27 ENCOUNTER — APPOINTMENT (OUTPATIENT)
Dept: LAB | Facility: CLINIC | Age: 77
End: 2022-09-27
Payer: COMMERCIAL

## 2022-09-27 DIAGNOSIS — E11.9 DIABETES MELLITUS WITHOUT COMPLICATION (HCC): ICD-10-CM

## 2022-09-27 LAB
ALBUMIN SERPL BCP-MCNC: 4.4 G/DL (ref 3.5–5)
ALP SERPL-CCNC: 62 U/L (ref 34–104)
ALT SERPL W P-5'-P-CCNC: 15 U/L (ref 7–52)
ANION GAP SERPL CALCULATED.3IONS-SCNC: 5 MMOL/L (ref 4–13)
AST SERPL W P-5'-P-CCNC: 15 U/L (ref 13–39)
BASOPHILS # BLD AUTO: 0.06 THOUSANDS/ΜL (ref 0–0.1)
BASOPHILS NFR BLD AUTO: 1 % (ref 0–1)
BILIRUB SERPL-MCNC: 0.61 MG/DL (ref 0.2–1)
BUN SERPL-MCNC: 14 MG/DL (ref 5–25)
CALCIUM SERPL-MCNC: 9.6 MG/DL (ref 8.4–10.2)
CHLORIDE SERPL-SCNC: 108 MMOL/L (ref 96–108)
CHOLEST SERPL-MCNC: 173 MG/DL
CO2 SERPL-SCNC: 27 MMOL/L (ref 21–32)
CREAT SERPL-MCNC: 0.93 MG/DL (ref 0.6–1.3)
EOSINOPHIL # BLD AUTO: 0.25 THOUSAND/ΜL (ref 0–0.61)
EOSINOPHIL NFR BLD AUTO: 4 % (ref 0–6)
ERYTHROCYTE [DISTWIDTH] IN BLOOD BY AUTOMATED COUNT: 13 % (ref 11.6–15.1)
EST. AVERAGE GLUCOSE BLD GHB EST-MCNC: 128 MG/DL
GFR SERPL CREATININE-BSD FRML MDRD: 77 ML/MIN/1.73SQ M
GLUCOSE P FAST SERPL-MCNC: 123 MG/DL (ref 65–99)
HBA1C MFR BLD: 6.1 %
HCT VFR BLD AUTO: 44.9 % (ref 36.5–49.3)
HDLC SERPL-MCNC: 61 MG/DL
HGB BLD-MCNC: 15.1 G/DL (ref 12–17)
IMM GRANULOCYTES # BLD AUTO: 0.01 THOUSAND/UL (ref 0–0.2)
IMM GRANULOCYTES NFR BLD AUTO: 0 % (ref 0–2)
LDLC SERPL CALC-MCNC: 99 MG/DL (ref 0–100)
LYMPHOCYTES # BLD AUTO: 1.68 THOUSANDS/ΜL (ref 0.6–4.47)
LYMPHOCYTES NFR BLD AUTO: 28 % (ref 14–44)
MCH RBC QN AUTO: 32.1 PG (ref 26.8–34.3)
MCHC RBC AUTO-ENTMCNC: 33.6 G/DL (ref 31.4–37.4)
MCV RBC AUTO: 96 FL (ref 82–98)
MONOCYTES # BLD AUTO: 0.6 THOUSAND/ΜL (ref 0.17–1.22)
MONOCYTES NFR BLD AUTO: 10 % (ref 4–12)
NEUTROPHILS # BLD AUTO: 3.42 THOUSANDS/ΜL (ref 1.85–7.62)
NEUTS SEG NFR BLD AUTO: 57 % (ref 43–75)
NONHDLC SERPL-MCNC: 112 MG/DL
NRBC BLD AUTO-RTO: 0 /100 WBCS
PLATELET # BLD AUTO: 236 THOUSANDS/UL (ref 149–390)
PMV BLD AUTO: 10.1 FL (ref 8.9–12.7)
POTASSIUM SERPL-SCNC: 4.5 MMOL/L (ref 3.5–5.3)
PROT SERPL-MCNC: 7.2 G/DL (ref 6.4–8.4)
RBC # BLD AUTO: 4.7 MILLION/UL (ref 3.88–5.62)
SODIUM SERPL-SCNC: 140 MMOL/L (ref 135–147)
T4 FREE SERPL-MCNC: 1.02 NG/DL (ref 0.76–1.46)
TRIGL SERPL-MCNC: 64 MG/DL
TSH SERPL DL<=0.05 MIU/L-ACNC: 2.35 UIU/ML (ref 0.45–4.5)
WBC # BLD AUTO: 6.02 THOUSAND/UL (ref 4.31–10.16)

## 2022-09-27 PROCEDURE — 83036 HEMOGLOBIN GLYCOSYLATED A1C: CPT

## 2022-09-27 PROCEDURE — 84443 ASSAY THYROID STIM HORMONE: CPT

## 2022-09-27 PROCEDURE — 36415 COLL VENOUS BLD VENIPUNCTURE: CPT

## 2022-09-27 PROCEDURE — 85025 COMPLETE CBC W/AUTO DIFF WBC: CPT

## 2022-09-27 PROCEDURE — 80053 COMPREHEN METABOLIC PANEL: CPT

## 2022-09-27 PROCEDURE — 84439 ASSAY OF FREE THYROXINE: CPT

## 2022-09-27 PROCEDURE — 80061 LIPID PANEL: CPT

## 2023-03-28 ENCOUNTER — APPOINTMENT (OUTPATIENT)
Dept: LAB | Facility: CLINIC | Age: 78
End: 2023-03-28

## 2023-03-28 DIAGNOSIS — E11.69 TYPE 2 DIABETES MELLITUS WITH OTHER SPECIFIED COMPLICATION, UNSPECIFIED WHETHER LONG TERM INSULIN USE (HCC): ICD-10-CM

## 2023-03-28 LAB
ALBUMIN SERPL BCP-MCNC: 4.4 G/DL (ref 3.5–5)
ALP SERPL-CCNC: 66 U/L (ref 34–104)
ALT SERPL W P-5'-P-CCNC: 15 U/L (ref 7–52)
ANION GAP SERPL CALCULATED.3IONS-SCNC: 7 MMOL/L (ref 4–13)
AST SERPL W P-5'-P-CCNC: 13 U/L (ref 13–39)
BASOPHILS # BLD AUTO: 0.06 THOUSANDS/ÂΜL (ref 0–0.1)
BASOPHILS NFR BLD AUTO: 1 % (ref 0–1)
BILIRUB SERPL-MCNC: 0.57 MG/DL (ref 0.2–1)
BUN SERPL-MCNC: 17 MG/DL (ref 5–25)
CALCIUM SERPL-MCNC: 9.8 MG/DL (ref 8.4–10.2)
CHLORIDE SERPL-SCNC: 106 MMOL/L (ref 96–108)
CHOLEST SERPL-MCNC: 175 MG/DL
CO2 SERPL-SCNC: 27 MMOL/L (ref 21–32)
CREAT SERPL-MCNC: 0.96 MG/DL (ref 0.6–1.3)
EOSINOPHIL # BLD AUTO: 0.32 THOUSAND/ÂΜL (ref 0–0.61)
EOSINOPHIL NFR BLD AUTO: 5 % (ref 0–6)
ERYTHROCYTE [DISTWIDTH] IN BLOOD BY AUTOMATED COUNT: 13 % (ref 11.6–15.1)
GFR SERPL CREATININE-BSD FRML MDRD: 74 ML/MIN/1.73SQ M
GLUCOSE P FAST SERPL-MCNC: 131 MG/DL (ref 65–99)
HCT VFR BLD AUTO: 48.4 % (ref 36.5–49.3)
HDLC SERPL-MCNC: 59 MG/DL
HGB BLD-MCNC: 15.8 G/DL (ref 12–17)
IMM GRANULOCYTES # BLD AUTO: 0.03 THOUSAND/UL (ref 0–0.2)
IMM GRANULOCYTES NFR BLD AUTO: 0 % (ref 0–2)
LDLC SERPL CALC-MCNC: 100 MG/DL (ref 0–100)
LYMPHOCYTES # BLD AUTO: 2.01 THOUSANDS/ÂΜL (ref 0.6–4.47)
LYMPHOCYTES NFR BLD AUTO: 29 % (ref 14–44)
MCH RBC QN AUTO: 31.4 PG (ref 26.8–34.3)
MCHC RBC AUTO-ENTMCNC: 32.6 G/DL (ref 31.4–37.4)
MCV RBC AUTO: 96 FL (ref 82–98)
MONOCYTES # BLD AUTO: 0.68 THOUSAND/ÂΜL (ref 0.17–1.22)
MONOCYTES NFR BLD AUTO: 10 % (ref 4–12)
NEUTROPHILS # BLD AUTO: 3.89 THOUSANDS/ÂΜL (ref 1.85–7.62)
NEUTS SEG NFR BLD AUTO: 55 % (ref 43–75)
NONHDLC SERPL-MCNC: 116 MG/DL
NRBC BLD AUTO-RTO: 0 /100 WBCS
PLATELET # BLD AUTO: 244 THOUSANDS/UL (ref 149–390)
PMV BLD AUTO: 10.3 FL (ref 8.9–12.7)
POTASSIUM SERPL-SCNC: 4.2 MMOL/L (ref 3.5–5.3)
PROT SERPL-MCNC: 7.1 G/DL (ref 6.4–8.4)
RBC # BLD AUTO: 5.03 MILLION/UL (ref 3.88–5.62)
SODIUM SERPL-SCNC: 140 MMOL/L (ref 135–147)
TRIGL SERPL-MCNC: 79 MG/DL
TSH SERPL DL<=0.05 MIU/L-ACNC: 1.74 UIU/ML (ref 0.45–4.5)
WBC # BLD AUTO: 6.99 THOUSAND/UL (ref 4.31–10.16)

## 2023-03-29 LAB
EST. AVERAGE GLUCOSE BLD GHB EST-MCNC: 126 MG/DL
HBA1C MFR BLD: 6 %
T4 FREE SERPL-MCNC: 0.99 NG/DL (ref 0.76–1.46)

## 2023-07-19 ENCOUNTER — ESTABLISHED COMPREHENSIVE EXAM (OUTPATIENT)
Dept: URBAN - METROPOLITAN AREA CLINIC 6 | Facility: CLINIC | Age: 78
End: 2023-07-19

## 2023-07-19 DIAGNOSIS — H35.373: ICD-10-CM

## 2023-07-19 DIAGNOSIS — H53.2: ICD-10-CM

## 2023-07-19 DIAGNOSIS — Z96.1: ICD-10-CM

## 2023-07-19 LAB
DIPLOPIA-BTEA- ARCNJ: NORMAL

## 2023-07-19 PROCEDURE — 92134 CPTRZ OPH DX IMG PST SGM RTA: CPT

## 2023-07-19 PROCEDURE — 92014 COMPRE OPH EXAM EST PT 1/>: CPT

## 2023-07-19 ASSESSMENT — TONOMETRY
OD_IOP_MMHG: 11
OS_IOP_MMHG: 12

## 2023-07-19 ASSESSMENT — VISUAL ACUITY
OS_GLARE: 20/40
OU_CC: J1+
OD_GLARE: 20/30
OD_CC: 20/25
OS_CC: 20/25

## 2023-08-01 ENCOUNTER — APPOINTMENT (OUTPATIENT)
Dept: LAB | Facility: CLINIC | Age: 78
End: 2023-08-01
Payer: COMMERCIAL

## 2023-08-01 DIAGNOSIS — H53.2 DIPLOPIA: ICD-10-CM

## 2023-08-01 LAB
ALBUMIN SERPL BCP-MCNC: 4.1 G/DL (ref 3.5–5)
ALP SERPL-CCNC: 69 U/L (ref 34–104)
ALT SERPL W P-5'-P-CCNC: 13 U/L (ref 7–52)
ANION GAP SERPL CALCULATED.3IONS-SCNC: 5 MMOL/L
AST SERPL W P-5'-P-CCNC: 13 U/L (ref 13–39)
BILIRUB SERPL-MCNC: 0.41 MG/DL (ref 0.2–1)
BUN SERPL-MCNC: 15 MG/DL (ref 5–25)
CALCIUM SERPL-MCNC: 9.4 MG/DL (ref 8.4–10.2)
CHLORIDE SERPL-SCNC: 107 MMOL/L (ref 96–108)
CO2 SERPL-SCNC: 28 MMOL/L (ref 21–32)
CREAT SERPL-MCNC: 0.85 MG/DL (ref 0.6–1.3)
ERYTHROCYTE [SEDIMENTATION RATE] IN BLOOD: 5 MM/HOUR (ref 0–19)
GFR SERPL CREATININE-BSD FRML MDRD: 82 ML/MIN/1.73SQ M
GLUCOSE SERPL-MCNC: 121 MG/DL (ref 65–140)
POTASSIUM SERPL-SCNC: 3.9 MMOL/L (ref 3.5–5.3)
PROT SERPL-MCNC: 6.6 G/DL (ref 6.4–8.4)
SODIUM SERPL-SCNC: 140 MMOL/L (ref 135–147)
TSH SERPL DL<=0.05 MIU/L-ACNC: 1.21 UIU/ML (ref 0.45–4.5)

## 2023-08-01 PROCEDURE — 84443 ASSAY THYROID STIM HORMONE: CPT

## 2023-08-01 PROCEDURE — 85652 RBC SED RATE AUTOMATED: CPT

## 2023-08-01 PROCEDURE — 83519 RIA NONANTIBODY: CPT

## 2023-08-01 PROCEDURE — 80053 COMPREHEN METABOLIC PANEL: CPT

## 2023-08-01 PROCEDURE — 86618 LYME DISEASE ANTIBODY: CPT

## 2023-08-01 PROCEDURE — 36415 COLL VENOUS BLD VENIPUNCTURE: CPT

## 2023-08-01 PROCEDURE — 86255 FLUORESCENT ANTIBODY SCREEN: CPT

## 2023-08-02 LAB — B BURGDOR IGG+IGM SER QL IA: NEGATIVE

## 2023-08-03 LAB — ACHR BIND AB SER-SCNC: <0.03 NMOL/L (ref 0–0.24)

## 2023-08-04 LAB
ACHR BLOCK AB/ACHR TOTAL SFR SER: 11 % (ref 0–25)
ACHR MOD AB/ACHR TOTAL SFR SER: 0 % (ref 0–45)

## 2023-08-10 ENCOUNTER — ESTABLISHED COMPREHENSIVE EXAM (OUTPATIENT)
Dept: URBAN - METROPOLITAN AREA CLINIC 6 | Facility: CLINIC | Age: 78
End: 2023-08-10

## 2023-08-10 DIAGNOSIS — N40.1 BPH WITH OBSTRUCTION/LOWER URINARY TRACT SYMPTOMS: ICD-10-CM

## 2023-08-10 DIAGNOSIS — H35.373: ICD-10-CM

## 2023-08-10 DIAGNOSIS — H53.2: ICD-10-CM

## 2023-08-10 DIAGNOSIS — Z96.1: ICD-10-CM

## 2023-08-10 DIAGNOSIS — N13.8 BPH WITH OBSTRUCTION/LOWER URINARY TRACT SYMPTOMS: ICD-10-CM

## 2023-08-10 PROCEDURE — 92012 INTRM OPH EXAM EST PATIENT: CPT

## 2023-08-10 ASSESSMENT — TONOMETRY
OS_IOP_MMHG: 13
OD_IOP_MMHG: 14

## 2023-08-10 ASSESSMENT — VISUAL ACUITY
OS_SC: J1
OS_CC: 20/25
OD_SC: J1
OD_CC: 20/20

## 2023-08-11 RX ORDER — TAMSULOSIN HYDROCHLORIDE 0.4 MG/1
CAPSULE ORAL
Qty: 90 CAPSULE | Refills: 3 | OUTPATIENT
Start: 2023-08-11

## 2023-09-29 ENCOUNTER — TELEPHONE (OUTPATIENT)
Age: 78
End: 2023-09-29

## 2023-09-29 DIAGNOSIS — N13.8 BPH WITH OBSTRUCTION/LOWER URINARY TRACT SYMPTOMS: ICD-10-CM

## 2023-09-29 DIAGNOSIS — N40.1 BPH WITH OBSTRUCTION/LOWER URINARY TRACT SYMPTOMS: ICD-10-CM

## 2023-09-29 RX ORDER — TAMSULOSIN HYDROCHLORIDE 0.4 MG/1
0.4 CAPSULE ORAL
Qty: 90 CAPSULE | Refills: 0 | Status: SHIPPED | OUTPATIENT
Start: 2023-09-29

## 2023-09-29 NOTE — TELEPHONE ENCOUNTER
Reason for call:     [x] Refill   [] Prior Auth  [] Other:     Office:   [] PCP/Provider -   [x] Speciality/Provider - urology  Medication:   tamsulosin 0.4, 1qd, 90      [facility]    Does the patient have enough for 3 days?    [x] Yes   [] No - Send as HP to POD    Patient will call back and schedule appt with urology

## 2023-09-29 NOTE — TELEPHONE ENCOUNTER
Pt made appointment and wants to know If he needs a psa before the appointment.  The appointment is in November     Pt can be reached at [phone]

## 2023-09-29 NOTE — TELEPHONE ENCOUNTER
Per last office visit, patient to return for annual office visit. No PSA was ordered or advised. Reviewed with patient. Patient verbalized understanding.

## 2023-10-31 ENCOUNTER — APPOINTMENT (OUTPATIENT)
Dept: LAB | Facility: CLINIC | Age: 78
End: 2023-10-31
Payer: COMMERCIAL

## 2023-10-31 DIAGNOSIS — E11.9 DIABETES MELLITUS WITHOUT COMPLICATION (HCC): ICD-10-CM

## 2023-10-31 LAB
ALBUMIN SERPL BCP-MCNC: 4.3 G/DL (ref 3.5–5)
ALP SERPL-CCNC: 60 U/L (ref 34–104)
ALT SERPL W P-5'-P-CCNC: 13 U/L (ref 7–52)
ANION GAP SERPL CALCULATED.3IONS-SCNC: 6 MMOL/L
AST SERPL W P-5'-P-CCNC: 12 U/L (ref 13–39)
BASOPHILS # BLD AUTO: 0.06 THOUSANDS/ÂΜL (ref 0–0.1)
BASOPHILS NFR BLD AUTO: 1 % (ref 0–1)
BILIRUB SERPL-MCNC: 0.52 MG/DL (ref 0.2–1)
BUN SERPL-MCNC: 15 MG/DL (ref 5–25)
CALCIUM SERPL-MCNC: 9.5 MG/DL (ref 8.4–10.2)
CHLORIDE SERPL-SCNC: 108 MMOL/L (ref 96–108)
CHOLEST SERPL-MCNC: 172 MG/DL
CO2 SERPL-SCNC: 27 MMOL/L (ref 21–32)
CREAT SERPL-MCNC: 0.77 MG/DL (ref 0.6–1.3)
EOSINOPHIL # BLD AUTO: 0.29 THOUSAND/ÂΜL (ref 0–0.61)
EOSINOPHIL NFR BLD AUTO: 4 % (ref 0–6)
ERYTHROCYTE [DISTWIDTH] IN BLOOD BY AUTOMATED COUNT: 13 % (ref 11.6–15.1)
GFR SERPL CREATININE-BSD FRML MDRD: 85 ML/MIN/1.73SQ M
GLUCOSE P FAST SERPL-MCNC: 119 MG/DL (ref 65–99)
HCT VFR BLD AUTO: 45.8 % (ref 36.5–49.3)
HDLC SERPL-MCNC: 58 MG/DL
HGB BLD-MCNC: 15.3 G/DL (ref 12–17)
IMM GRANULOCYTES # BLD AUTO: 0.02 THOUSAND/UL (ref 0–0.2)
IMM GRANULOCYTES NFR BLD AUTO: 0 % (ref 0–2)
LDLC SERPL CALC-MCNC: 100 MG/DL (ref 0–100)
LYMPHOCYTES # BLD AUTO: 1.71 THOUSANDS/ÂΜL (ref 0.6–4.47)
LYMPHOCYTES NFR BLD AUTO: 25 % (ref 14–44)
MCH RBC QN AUTO: 32.2 PG (ref 26.8–34.3)
MCHC RBC AUTO-ENTMCNC: 33.4 G/DL (ref 31.4–37.4)
MCV RBC AUTO: 96 FL (ref 82–98)
MONOCYTES # BLD AUTO: 0.63 THOUSAND/ÂΜL (ref 0.17–1.22)
MONOCYTES NFR BLD AUTO: 9 % (ref 4–12)
NEUTROPHILS # BLD AUTO: 4.14 THOUSANDS/ÂΜL (ref 1.85–7.62)
NEUTS SEG NFR BLD AUTO: 61 % (ref 43–75)
NONHDLC SERPL-MCNC: 114 MG/DL
NRBC BLD AUTO-RTO: 0 /100 WBCS
PLATELET # BLD AUTO: 228 THOUSANDS/UL (ref 149–390)
PMV BLD AUTO: 9.8 FL (ref 8.9–12.7)
POTASSIUM SERPL-SCNC: 4.2 MMOL/L (ref 3.5–5.3)
PROT SERPL-MCNC: 6.7 G/DL (ref 6.4–8.4)
RBC # BLD AUTO: 4.75 MILLION/UL (ref 3.88–5.62)
SODIUM SERPL-SCNC: 141 MMOL/L (ref 135–147)
TRIGL SERPL-MCNC: 72 MG/DL
TSH SERPL DL<=0.05 MIU/L-ACNC: 2.28 UIU/ML (ref 0.45–4.5)
WBC # BLD AUTO: 6.85 THOUSAND/UL (ref 4.31–10.16)

## 2023-10-31 PROCEDURE — 36415 COLL VENOUS BLD VENIPUNCTURE: CPT

## 2023-10-31 PROCEDURE — 80053 COMPREHEN METABOLIC PANEL: CPT

## 2023-10-31 PROCEDURE — 85025 COMPLETE CBC W/AUTO DIFF WBC: CPT

## 2023-10-31 PROCEDURE — 84443 ASSAY THYROID STIM HORMONE: CPT

## 2023-10-31 PROCEDURE — 80061 LIPID PANEL: CPT

## 2023-11-14 ENCOUNTER — OFFICE VISIT (OUTPATIENT)
Dept: UROLOGY | Facility: CLINIC | Age: 78
End: 2023-11-14
Payer: COMMERCIAL

## 2023-11-14 VITALS
DIASTOLIC BLOOD PRESSURE: 80 MMHG | WEIGHT: 169 LBS | BODY MASS INDEX: 26.53 KG/M2 | OXYGEN SATURATION: 97 % | SYSTOLIC BLOOD PRESSURE: 140 MMHG | HEART RATE: 65 BPM | HEIGHT: 67 IN

## 2023-11-14 DIAGNOSIS — N40.0 BENIGN PROSTATIC HYPERPLASIA, UNSPECIFIED WHETHER LOWER URINARY TRACT SYMPTOMS PRESENT: Primary | ICD-10-CM

## 2023-11-14 DIAGNOSIS — R97.20 ELEVATED PSA: ICD-10-CM

## 2023-11-14 DIAGNOSIS — N13.8 BPH WITH OBSTRUCTION/LOWER URINARY TRACT SYMPTOMS: ICD-10-CM

## 2023-11-14 DIAGNOSIS — N40.1 BPH WITH OBSTRUCTION/LOWER URINARY TRACT SYMPTOMS: ICD-10-CM

## 2023-11-14 LAB
POST-VOID RESIDUAL VOLUME, ML POC: 42 ML
SL AMB  POCT GLUCOSE, UA: NORMAL
SL AMB LEUKOCYTE ESTERASE,UA: NORMAL
SL AMB POCT BILIRUBIN,UA: NORMAL
SL AMB POCT BLOOD,UA: NORMAL
SL AMB POCT CLARITY,UA: CLEAR
SL AMB POCT COLOR,UA: YELLOW
SL AMB POCT KETONES,UA: NORMAL
SL AMB POCT NITRITE,UA: NORMAL
SL AMB POCT PH,UA: 6
SL AMB POCT SPECIFIC GRAVITY,UA: 1030
SL AMB POCT URINE PROTEIN: NORMAL
SL AMB POCT UROBILINOGEN: NORMAL

## 2023-11-14 PROCEDURE — 81002 URINALYSIS NONAUTO W/O SCOPE: CPT | Performed by: PHYSICIAN ASSISTANT

## 2023-11-14 PROCEDURE — 99213 OFFICE O/P EST LOW 20 MIN: CPT | Performed by: PHYSICIAN ASSISTANT

## 2023-11-14 PROCEDURE — 51798 US URINE CAPACITY MEASURE: CPT | Performed by: PHYSICIAN ASSISTANT

## 2023-11-14 RX ORDER — TAMSULOSIN HYDROCHLORIDE 0.4 MG/1
0.4 CAPSULE ORAL
Qty: 90 CAPSULE | Refills: 3 | Status: SHIPPED | OUTPATIENT
Start: 2023-11-14

## 2023-11-14 RX ORDER — TADALAFIL 5 MG/1
5 TABLET ORAL DAILY
Qty: 90 TABLET | Refills: 1 | Status: SHIPPED | OUTPATIENT
Start: 2023-11-14

## 2023-11-14 NOTE — PROGRESS NOTES
UROLOGY PROGRESS NOTE   Patient Identifiers: Francis Hollingsworth (MRN [de-identified])  Date of Service: 11/14/2023    Subjective:   79 yo male patient with a history of BPH and elevated PSA status post 2 negative prostate biopsies presenting for follow-up. Patient has a history of elevated PSA. Has undergone 2 biopsies of the prostate which were negative, last in 2003. He had a 4K score showing 92% negative predictive value for aggressive disease. Patient's most recent PSA is 6.75 (5/6/21), 4.6, 6.3 (03/19/2020), 4.9 (2019). No recent updated PSA     On tamsulosin monotherapy. Patient previously prescribed finasteride however never took it d/t concerns of side effect profile. Increasing urinary bother - AUA report below. No hematuria nor infections. Urine dip leukocyte, nitrite, blood negative. PVR 42mL    AUA SYMPTOM SCORE      Flowsheet Row Most Recent Value   AUA SYMPTOM SCORE    How often have you had a sensation of not emptying your bladder completely after you finished urinating? 4   How often have you had to urinate again less than two hours after you finished urinating? 4   How often have you found you stopped and started again several times when you urinate? 4   How often have you found it difficult to postpone urination? 4   How often have you had a weak urinary stream? 4   How often have you had to push or strain to begin urination? 4   How many times did you most typically get up to urinate from the time you went to bed at night until the time you got up in the morning?  2   Quality of Life: If you were to spend the rest of your life with your urinary condition just the way it is now, how would you feel about that? 3   AUA SYMPTOM SCORE 26              Objective:     VITALS:    Vitals:    11/14/23 0743   BP: 140/80   Pulse: 65   SpO2: 97%         LABS:  Lab Results   Component Value Date    HGB 15.3 10/31/2023    HCT 45.8 10/31/2023    WBC 6.85 10/31/2023     10/31/2023   ]    Lab Results   Component Value Date     12/30/2015    K 4.2 10/31/2023     10/31/2023    CO2 27 10/31/2023    BUN 15 10/31/2023    CREATININE 0.77 10/31/2023    CALCIUM 9.5 10/31/2023    GLUCOSE 105 12/30/2015   ]        INPATIENT MEDS:    Current Outpatient Medications:     B Complex Vitamins (VITAMIN B-COMPLEX PO), Take by mouth, Disp: , Rfl:     Cholecalciferol (VITAMIN D-3 PO), Take by mouth, Disp: , Rfl:     GLUCOSAMINE-CHONDROITIN-CA-D3 PO, Take by mouth, Disp: , Rfl:     meclizine (ANTIVERT) 25 mg tablet, Take 1 tablet (25 mg total) by mouth 3 (three) times a day as needed for dizziness, Disp: 30 tablet, Rfl: 0    Multiple Vitamin (MULTIVITAMIN) tablet, Take 1 tablet by mouth daily, Disp: , Rfl:     tadalafil (CIALIS) 5 MG tablet, Take 1 tablet (5 mg total) by mouth in the morning, Disp: 90 tablet, Rfl: 1    tamsulosin (FLOMAX) 0.4 mg, Take 1 capsule (0.4 mg total) by mouth daily with dinner, Disp: 90 capsule, Rfl: 3      Physical Exam:   /80 (BP Location: Left arm, Patient Position: Sitting, Cuff Size: Standard)   Pulse 65   Ht 5' 7" (1.702 m)   Wt 76.7 kg (169 lb)   SpO2 97%   BMI 26.47 kg/m²   GEN: no acute distress    RESP: breathing comfortably with no accessory muscle use    ABD: soft, non-tender, non-distended   INCISION:    EXT: no significant peripheral edema   (Male): good rectal tone. Prostate 45g, smooth, no nodules    Assessment/Plan:    1. Hx of elevated PSA   - s/p 2 negative prostate biopsies  - normal ROBERTO  - will update due to escalating symptoms     2. BPH with LUTS  - continue tamsulosin  - patient not interested in finasteride nor surgical evaluation  - can trial cialis 5mg in addition to tamsulosin. Use and side effect profile reviewed.

## 2023-11-27 DIAGNOSIS — N40.0 BENIGN PROSTATIC HYPERPLASIA, UNSPECIFIED WHETHER LOWER URINARY TRACT SYMPTOMS PRESENT: ICD-10-CM

## 2023-11-27 RX ORDER — TADALAFIL 5 MG/1
5 TABLET ORAL DAILY
Qty: 90 TABLET | Refills: 1 | Status: SHIPPED | OUTPATIENT
Start: 2023-11-27

## 2024-05-02 ENCOUNTER — APPOINTMENT (OUTPATIENT)
Dept: LAB | Facility: CLINIC | Age: 79
End: 2024-05-02
Payer: COMMERCIAL

## 2024-05-02 DIAGNOSIS — E11.69 TYPE 2 DIABETES MELLITUS WITH OTHER SPECIFIED COMPLICATION, UNSPECIFIED WHETHER LONG TERM INSULIN USE (HCC): ICD-10-CM

## 2024-05-02 DIAGNOSIS — R97.20 ELEVATED PSA: ICD-10-CM

## 2024-05-02 LAB
BASOPHILS # BLD AUTO: 0.05 THOUSANDS/ÂΜL (ref 0–0.1)
BASOPHILS NFR BLD AUTO: 1 % (ref 0–1)
CHOLEST SERPL-MCNC: 174 MG/DL
EOSINOPHIL # BLD AUTO: 0.3 THOUSAND/ÂΜL (ref 0–0.61)
EOSINOPHIL NFR BLD AUTO: 5 % (ref 0–6)
ERYTHROCYTE [DISTWIDTH] IN BLOOD BY AUTOMATED COUNT: 12.9 % (ref 11.6–15.1)
EST. AVERAGE GLUCOSE BLD GHB EST-MCNC: 151 MG/DL
HBA1C MFR BLD: 6.9 %
HCT VFR BLD AUTO: 44.1 % (ref 36.5–49.3)
HDLC SERPL-MCNC: 54 MG/DL
HGB BLD-MCNC: 14.7 G/DL (ref 12–17)
IMM GRANULOCYTES # BLD AUTO: 0.02 THOUSAND/UL (ref 0–0.2)
IMM GRANULOCYTES NFR BLD AUTO: 0 % (ref 0–2)
LDLC SERPL CALC-MCNC: 109 MG/DL (ref 0–100)
LYMPHOCYTES # BLD AUTO: 1.8 THOUSANDS/ÂΜL (ref 0.6–4.47)
LYMPHOCYTES NFR BLD AUTO: 31 % (ref 14–44)
MCH RBC QN AUTO: 32 PG (ref 26.8–34.3)
MCHC RBC AUTO-ENTMCNC: 33.3 G/DL (ref 31.4–37.4)
MCV RBC AUTO: 96 FL (ref 82–98)
MONOCYTES # BLD AUTO: 0.58 THOUSAND/ÂΜL (ref 0.17–1.22)
MONOCYTES NFR BLD AUTO: 10 % (ref 4–12)
NEUTROPHILS # BLD AUTO: 3.13 THOUSANDS/ÂΜL (ref 1.85–7.62)
NEUTS SEG NFR BLD AUTO: 53 % (ref 43–75)
NONHDLC SERPL-MCNC: 120 MG/DL
NRBC BLD AUTO-RTO: 0 /100 WBCS
PLATELET # BLD AUTO: 223 THOUSANDS/UL (ref 149–390)
PMV BLD AUTO: 10.2 FL (ref 8.9–12.7)
PSA SERPL-MCNC: 5.61 NG/ML (ref 0–4)
RBC # BLD AUTO: 4.59 MILLION/UL (ref 3.88–5.62)
TRIGL SERPL-MCNC: 55 MG/DL
TSH SERPL DL<=0.05 MIU/L-ACNC: 2.22 UIU/ML (ref 0.45–4.5)
WBC # BLD AUTO: 5.88 THOUSAND/UL (ref 4.31–10.16)

## 2024-05-02 PROCEDURE — 85025 COMPLETE CBC W/AUTO DIFF WBC: CPT

## 2024-05-02 PROCEDURE — 83036 HEMOGLOBIN GLYCOSYLATED A1C: CPT

## 2024-05-02 PROCEDURE — 84443 ASSAY THYROID STIM HORMONE: CPT

## 2024-05-02 PROCEDURE — 36415 COLL VENOUS BLD VENIPUNCTURE: CPT

## 2024-05-02 PROCEDURE — 84153 ASSAY OF PSA TOTAL: CPT

## 2024-05-02 PROCEDURE — 80053 COMPREHEN METABOLIC PANEL: CPT

## 2024-05-02 PROCEDURE — 80061 LIPID PANEL: CPT

## 2024-05-08 LAB
ALBUMIN SERPL BCP-MCNC: 4.3 G/DL (ref 3.5–5)
ALP SERPL-CCNC: 60 U/L (ref 34–104)
ALT SERPL W P-5'-P-CCNC: 15 U/L (ref 7–52)
ANION GAP SERPL CALCULATED.3IONS-SCNC: 5 MMOL/L (ref 4–13)
AST SERPL W P-5'-P-CCNC: 14 U/L (ref 13–39)
BILIRUB SERPL-MCNC: 0.49 MG/DL (ref 0.2–1)
BUN SERPL-MCNC: 20 MG/DL (ref 5–25)
CALCIUM SERPL-MCNC: 9.3 MG/DL (ref 8.4–10.2)
CHLORIDE SERPL-SCNC: 107 MMOL/L (ref 96–108)
CO2 SERPL-SCNC: 27 MMOL/L (ref 21–32)
CREAT SERPL-MCNC: 0.9 MG/DL (ref 0.6–1.3)
GFR SERPL CREATININE-BSD FRML MDRD: 80 ML/MIN/1.73SQ M
GLUCOSE P FAST SERPL-MCNC: 120 MG/DL (ref 65–99)
POTASSIUM SERPL-SCNC: 4.1 MMOL/L (ref 3.5–5.3)
PROT SERPL-MCNC: 6.8 G/DL (ref 6.4–8.4)
SODIUM SERPL-SCNC: 139 MMOL/L (ref 135–147)

## 2024-05-16 ENCOUNTER — OFFICE VISIT (OUTPATIENT)
Dept: UROLOGY | Facility: CLINIC | Age: 79
End: 2024-05-16
Payer: COMMERCIAL

## 2024-05-16 VITALS
SYSTOLIC BLOOD PRESSURE: 124 MMHG | DIASTOLIC BLOOD PRESSURE: 68 MMHG | HEIGHT: 67 IN | WEIGHT: 170.2 LBS | OXYGEN SATURATION: 96 % | BODY MASS INDEX: 26.71 KG/M2 | HEART RATE: 70 BPM

## 2024-05-16 DIAGNOSIS — N13.8 BPH WITH OBSTRUCTION/LOWER URINARY TRACT SYMPTOMS: Primary | ICD-10-CM

## 2024-05-16 DIAGNOSIS — N40.1 BPH WITH OBSTRUCTION/LOWER URINARY TRACT SYMPTOMS: Primary | ICD-10-CM

## 2024-05-16 PROCEDURE — 99213 OFFICE O/P EST LOW 20 MIN: CPT | Performed by: PHYSICIAN ASSISTANT

## 2024-05-16 RX ORDER — FINASTERIDE 5 MG/1
5 TABLET, FILM COATED ORAL DAILY
Qty: 90 TABLET | Refills: 3 | Status: SHIPPED | OUTPATIENT
Start: 2024-05-16

## 2024-10-24 ENCOUNTER — ESTABLISHED COMPREHENSIVE EXAM (OUTPATIENT)
Dept: URBAN - METROPOLITAN AREA CLINIC 6 | Facility: CLINIC | Age: 79
End: 2024-10-24

## 2024-10-24 DIAGNOSIS — H35.373: ICD-10-CM

## 2024-10-24 DIAGNOSIS — Z96.1: ICD-10-CM

## 2024-10-24 PROCEDURE — 92014 COMPRE OPH EXAM EST PT 1/>: CPT

## 2024-10-24 PROCEDURE — 92015 DETERMINE REFRACTIVE STATE: CPT

## 2024-10-24 ASSESSMENT — VISUAL ACUITY
OD_CC: 20/20-1
OU_CC: J1+
OS_CC: 20/25+2

## 2024-10-24 ASSESSMENT — TONOMETRY
OD_IOP_MMHG: 10
OS_IOP_MMHG: 13

## 2024-11-04 DIAGNOSIS — N40.1 BPH WITH OBSTRUCTION/LOWER URINARY TRACT SYMPTOMS: ICD-10-CM

## 2024-11-04 DIAGNOSIS — N13.8 BPH WITH OBSTRUCTION/LOWER URINARY TRACT SYMPTOMS: ICD-10-CM

## 2024-11-04 RX ORDER — FINASTERIDE 5 MG/1
5 TABLET, FILM COATED ORAL DAILY
Qty: 90 TABLET | Refills: 1 | Status: SHIPPED | OUTPATIENT
Start: 2024-11-04

## 2024-11-04 NOTE — TELEPHONE ENCOUNTER
This is not a duplicate- change in pharmacy     Reason for call:   [x] Refill   [] Prior Auth  [] Other:     Office:   [] PCP/Provider -   [x] Specialty/Provider -   Mann Kincaid PA-C-[facility] [place] ( patient verified)    Medication:   finasteride (PROSCAR) 5 mg tablet   marcelo 1 tablet (5 mg total) by mouth daily    Quantity: 90    [facility]    Does the patient have enough for 3 days?   [] Yes   [x] No - Send as HP to POD  Needs sooner due to being sent via mail order

## 2024-11-05 ENCOUNTER — APPOINTMENT (OUTPATIENT)
Dept: LAB | Facility: CLINIC | Age: 79
End: 2024-11-05
Payer: COMMERCIAL

## 2024-11-05 DIAGNOSIS — E11.9 DIABETES MELLITUS WITHOUT COMPLICATION (HCC): ICD-10-CM

## 2024-11-05 LAB
ALBUMIN SERPL BCG-MCNC: 4.5 G/DL (ref 3.5–5)
ALP SERPL-CCNC: 58 U/L (ref 34–104)
ALT SERPL W P-5'-P-CCNC: 25 U/L (ref 7–52)
ANION GAP SERPL CALCULATED.3IONS-SCNC: 4 MMOL/L (ref 4–13)
AST SERPL W P-5'-P-CCNC: 20 U/L (ref 13–39)
BASOPHILS # BLD AUTO: 0.05 THOUSANDS/ΜL (ref 0–0.1)
BASOPHILS NFR BLD AUTO: 1 % (ref 0–1)
BILIRUB SERPL-MCNC: 0.63 MG/DL (ref 0.2–1)
BUN SERPL-MCNC: 19 MG/DL (ref 5–25)
CALCIUM SERPL-MCNC: 10 MG/DL (ref 8.4–10.2)
CHLORIDE SERPL-SCNC: 107 MMOL/L (ref 96–108)
CHOLEST SERPL-MCNC: 210 MG/DL
CO2 SERPL-SCNC: 29 MMOL/L (ref 21–32)
CREAT SERPL-MCNC: 0.93 MG/DL (ref 0.6–1.3)
EOSINOPHIL # BLD AUTO: 0.21 THOUSAND/ΜL (ref 0–0.61)
EOSINOPHIL NFR BLD AUTO: 3 % (ref 0–6)
ERYTHROCYTE [DISTWIDTH] IN BLOOD BY AUTOMATED COUNT: 13.1 % (ref 11.6–15.1)
GFR SERPL CREATININE-BSD FRML MDRD: 76 ML/MIN/1.73SQ M
GLUCOSE P FAST SERPL-MCNC: 122 MG/DL (ref 65–99)
HCT VFR BLD AUTO: 45.6 % (ref 36.5–49.3)
HDLC SERPL-MCNC: 63 MG/DL
HGB BLD-MCNC: 15 G/DL (ref 12–17)
IMM GRANULOCYTES # BLD AUTO: 0.01 THOUSAND/UL (ref 0–0.2)
IMM GRANULOCYTES NFR BLD AUTO: 0 % (ref 0–2)
LDLC SERPL CALC-MCNC: 132 MG/DL (ref 0–100)
LYMPHOCYTES # BLD AUTO: 1.64 THOUSANDS/ΜL (ref 0.6–4.47)
LYMPHOCYTES NFR BLD AUTO: 25 % (ref 14–44)
MCH RBC QN AUTO: 31.4 PG (ref 26.8–34.3)
MCHC RBC AUTO-ENTMCNC: 32.9 G/DL (ref 31.4–37.4)
MCV RBC AUTO: 95 FL (ref 82–98)
MONOCYTES # BLD AUTO: 0.68 THOUSAND/ΜL (ref 0.17–1.22)
MONOCYTES NFR BLD AUTO: 10 % (ref 4–12)
NEUTROPHILS # BLD AUTO: 4.01 THOUSANDS/ΜL (ref 1.85–7.62)
NEUTS SEG NFR BLD AUTO: 61 % (ref 43–75)
NONHDLC SERPL-MCNC: 147 MG/DL
NRBC BLD AUTO-RTO: 0 /100 WBCS
PLATELET # BLD AUTO: 230 THOUSANDS/UL (ref 149–390)
PMV BLD AUTO: 10 FL (ref 8.9–12.7)
POTASSIUM SERPL-SCNC: 4.5 MMOL/L (ref 3.5–5.3)
PROT SERPL-MCNC: 7.2 G/DL (ref 6.4–8.4)
RBC # BLD AUTO: 4.78 MILLION/UL (ref 3.88–5.62)
SODIUM SERPL-SCNC: 140 MMOL/L (ref 135–147)
T4 FREE SERPL-MCNC: 0.99 NG/DL (ref 0.61–1.12)
TRIGL SERPL-MCNC: 74 MG/DL
TSH SERPL DL<=0.05 MIU/L-ACNC: 1.87 UIU/ML (ref 0.45–4.5)
WBC # BLD AUTO: 6.6 THOUSAND/UL (ref 4.31–10.16)

## 2024-11-05 PROCEDURE — 84443 ASSAY THYROID STIM HORMONE: CPT

## 2024-11-05 PROCEDURE — 80061 LIPID PANEL: CPT

## 2024-11-05 PROCEDURE — 80053 COMPREHEN METABOLIC PANEL: CPT

## 2024-11-05 PROCEDURE — 85025 COMPLETE CBC W/AUTO DIFF WBC: CPT

## 2024-11-05 PROCEDURE — 36415 COLL VENOUS BLD VENIPUNCTURE: CPT

## 2024-11-05 PROCEDURE — 84439 ASSAY OF FREE THYROXINE: CPT

## 2024-12-03 ENCOUNTER — OFFICE VISIT (OUTPATIENT)
Dept: UROLOGY | Facility: CLINIC | Age: 79
End: 2024-12-03
Payer: COMMERCIAL

## 2024-12-03 VITALS
OXYGEN SATURATION: 97 % | SYSTOLIC BLOOD PRESSURE: 124 MMHG | BODY MASS INDEX: 27 KG/M2 | DIASTOLIC BLOOD PRESSURE: 74 MMHG | HEIGHT: 67 IN | WEIGHT: 172 LBS | HEART RATE: 67 BPM

## 2024-12-03 DIAGNOSIS — N13.8 BPH WITH OBSTRUCTION/LOWER URINARY TRACT SYMPTOMS: Primary | ICD-10-CM

## 2024-12-03 DIAGNOSIS — N40.1 BPH WITH OBSTRUCTION/LOWER URINARY TRACT SYMPTOMS: Primary | ICD-10-CM

## 2024-12-03 PROCEDURE — 99213 OFFICE O/P EST LOW 20 MIN: CPT | Performed by: PHYSICIAN ASSISTANT

## 2024-12-03 NOTE — PROGRESS NOTES
UROLOGY PROGRESS NOTE   Patient Identifiers: Jose Juan Quintana (MRN [id number])  Date of Service: 12/3/2024    Subjective:   79-year-old man history of BPH and elevated PSA.  He has had 2 negative prostate biopsies.  He has been on tamsulosin with some urge leak.  Last PSA 5.61.  We stopped doing PSAs and I put him on finasteride in May.    Reason for visit: BPH follow-up    Objective:     VITALS:    There were no vitals filed for this visit.        LABS:  Lab Results   Component Value Date    HGB 15.0 11/05/2024    HCT 45.6 11/05/2024    WBC 6.60 11/05/2024     11/05/2024   ]    Lab Results   Component Value Date     12/30/2015    K 4.5 11/05/2024     11/05/2024    CO2 29 11/05/2024    BUN 19 11/05/2024    CREATININE 0.93 11/05/2024    CALCIUM 10.0 11/05/2024    GLUCOSE 105 12/30/2015   ]        INPATIENT MEDS:    Current Outpatient Medications:     B Complex Vitamins (VITAMIN B-COMPLEX PO), Take by mouth, Disp: , Rfl:     Cholecalciferol (VITAMIN D-3 PO), Take by mouth, Disp: , Rfl:     finasteride (PROSCAR) 5 mg tablet, Take 1 tablet (5 mg total) by mouth daily, Disp: 90 tablet, Rfl: 1    GLUCOSAMINE-CHONDROITIN-CA-D3 PO, Take by mouth, Disp: , Rfl:     meclizine (ANTIVERT) 25 mg tablet, Take 1 tablet (25 mg total) by mouth 3 (three) times a day as needed for dizziness (Patient not taking: Reported on 5/16/2024), Disp: 30 tablet, Rfl: 0    Multiple Vitamin (MULTIVITAMIN) tablet, Take 1 tablet by mouth daily, Disp: , Rfl:     tadalafil (CIALIS) 5 MG tablet, Take 1 tablet (5 mg total) by mouth in the morning, Disp: 90 tablet, Rfl: 1    tamsulosin (FLOMAX) 0.4 mg, Take 1 capsule (0.4 mg total) by mouth daily with dinner, Disp: 90 capsule, Rfl: 3      Physical Exam:   There were no vitals taken for this visit.  GEN: no acute distress    RESP: breathing comfortably with no accessory muscle use    ABD: soft, non-tender, non-distended   INCISION:    EXT: no significant peripheral edema   (Male): Penis  circumcised, phallus normal, meatus patent.  Testicles descended into scrotum bilaterally without masses nor tenderness.  No inguinal hernias bilaterally.  ROBERTO: Prostate is not enlarged at 45 grams. The prostate is not boggy. The prostate is not tender.  No nodules noted      RADIOLOGY:   None    Assessment:   1.  BPH  2.  Elevated PSA    Plan:   -Follow-up in 1 year for his annual exam  -  -  -

## 2025-01-02 DIAGNOSIS — N40.1 BPH WITH OBSTRUCTION/LOWER URINARY TRACT SYMPTOMS: ICD-10-CM

## 2025-01-02 DIAGNOSIS — N13.8 BPH WITH OBSTRUCTION/LOWER URINARY TRACT SYMPTOMS: ICD-10-CM

## 2025-01-02 RX ORDER — TAMSULOSIN HYDROCHLORIDE 0.4 MG/1
0.4 CAPSULE ORAL
Qty: 90 CAPSULE | Refills: 1 | Status: SHIPPED | OUTPATIENT
Start: 2025-01-02

## 2025-01-02 NOTE — TELEPHONE ENCOUNTER
Reason for call:   [x] Refill   [] Prior Auth  [] Other:     Office:   [] PCP/Provider -   [x] Specialty/Provider - Uro    Medication: tamsulosin (FLOMAX) 0.4 mg     Dose/Frequency: Take 1 capsule (0.4 mg total) by mouth daily with dinner     Quantity: 90    Ph[facility]: [facility] - [place], [place] - [address] [place] [address]      Does the patient have enough for 3 days?   [] Yes   [x] No - Send as HP to POD

## 2025-03-03 DIAGNOSIS — N40.1 BPH WITH OBSTRUCTION/LOWER URINARY TRACT SYMPTOMS: ICD-10-CM

## 2025-03-03 DIAGNOSIS — N13.8 BPH WITH OBSTRUCTION/LOWER URINARY TRACT SYMPTOMS: ICD-10-CM

## 2025-03-04 RX ORDER — FINASTERIDE 5 MG/1
5 TABLET, FILM COATED ORAL DAILY
Qty: 90 TABLET | Refills: 1 | Status: SHIPPED | OUTPATIENT
Start: 2025-03-04

## 2025-06-26 DIAGNOSIS — N40.1 BPH WITH OBSTRUCTION/LOWER URINARY TRACT SYMPTOMS: ICD-10-CM

## 2025-06-26 DIAGNOSIS — N13.8 BPH WITH OBSTRUCTION/LOWER URINARY TRACT SYMPTOMS: ICD-10-CM

## 2025-06-26 RX ORDER — TAMSULOSIN HYDROCHLORIDE 0.4 MG/1
0.4 CAPSULE ORAL
Qty: 90 CAPSULE | Refills: 1 | Status: SHIPPED | OUTPATIENT
Start: 2025-06-26

## 2025-06-26 NOTE — TELEPHONE ENCOUNTER
Reason for call:   [x] Refill   [] Prior Auth  [] Other:     Office:   [] PCP/Provider -   [x] Specialty/Provider - Urology/ BRYANT Kincaid    Medication: tamsulosin (FLOMAX) 0.4 mg     Dose/Frequency: Take 1 capsule (0.4 mg total) by mouth daily with dinner    Quantity: 90    [facility]acy: [facility] [address] [phone]    Local Pharmacy   Does the patient have enough for 3 days?   [] Yes   [] No - Send as HP to POD    Mail Away Pharmacy   Does the patient have enough for 10 days?   [] Yes   [x] No - Send as HP to POD

## (undated) DEVICE — REM POLYHESIVE ADULT PATIENT RETURN ELECTRODE: Brand: VALLEYLAB

## (undated) DEVICE — STRL UNIVERSAL MINOR GENERAL: Brand: CARDINAL HEALTH

## (undated) DEVICE — BASIC SINGLE BASIN 2-LF: Brand: MEDLINE INDUSTRIES, INC.

## (undated) DEVICE — POOLE SUCTION HANDLE: Brand: CARDINAL HEALTH

## (undated) DEVICE — 3000CC GUARDIAN II: Brand: GUARDIAN

## (undated) DEVICE — SPONGE STICK WITH PVP-I: Brand: KENDALL

## (undated) DEVICE — GLOVE SRG BIOGEL ECLIPSE 7.5

## (undated) DEVICE — BUTTON SWITCH PENCIL HOLSTER: Brand: VALLEYLAB

## (undated) DEVICE — NEEDLE 25G X 1 1/2

## (undated) DEVICE — LUBRICANT SURGILUBE TUBE 4 OZ  FLIP TOP

## (undated) DEVICE — TUBING SUCTION 5MM X 12 FT

## (undated) DEVICE — SUT VICRYL 5-0 RB-1 27 IN J213H

## (undated) DEVICE — SUT CHROMIC 3-0 SH 27 IN G122H